# Patient Record
Sex: FEMALE | Race: WHITE | NOT HISPANIC OR LATINO | Employment: FULL TIME
[De-identification: names, ages, dates, MRNs, and addresses within clinical notes are randomized per-mention and may not be internally consistent; named-entity substitution may affect disease eponyms.]

---

## 2017-10-30 ENCOUNTER — RECORDS - HEALTHEAST (OUTPATIENT)
Dept: ADMINISTRATIVE | Facility: OTHER | Age: 22
End: 2017-10-30

## 2018-06-26 ENCOUNTER — COMMUNICATION - HEALTHEAST (OUTPATIENT)
Dept: SURGERY | Facility: CLINIC | Age: 23
End: 2018-06-26

## 2018-08-03 ENCOUNTER — RECORDS - HEALTHEAST (OUTPATIENT)
Dept: ADMINISTRATIVE | Facility: OTHER | Age: 23
End: 2018-08-03

## 2018-08-03 ENCOUNTER — RECORDS - HEALTHEAST (OUTPATIENT)
Dept: LAB | Facility: CLINIC | Age: 23
End: 2018-08-03

## 2018-08-05 LAB
BACTERIA SPEC CULT: ABNORMAL
BACTERIA SPEC CULT: ABNORMAL

## 2018-08-17 ENCOUNTER — OFFICE VISIT - HEALTHEAST (OUTPATIENT)
Dept: SURGERY | Facility: CLINIC | Age: 23
End: 2018-08-17

## 2018-08-17 DIAGNOSIS — E28.2 PCOS (POLYCYSTIC OVARIAN SYNDROME): ICD-10-CM

## 2018-08-17 DIAGNOSIS — E66.01 OBESITY, CLASS III, BMI 40-49.9 (MORBID OBESITY) (H): ICD-10-CM

## 2018-08-17 ASSESSMENT — MIFFLIN-ST. JEOR: SCORE: 2016.41

## 2018-09-06 ENCOUNTER — OFFICE VISIT - HEALTHEAST (OUTPATIENT)
Dept: SURGERY | Facility: CLINIC | Age: 23
End: 2018-09-06

## 2018-09-06 DIAGNOSIS — Z71.3 DIETARY COUNSELING: ICD-10-CM

## 2018-09-06 DIAGNOSIS — E66.01 OBESITY, MORBID, BMI 40.0-49.9 (H): ICD-10-CM

## 2018-09-06 ASSESSMENT — MIFFLIN-ST. JEOR: SCORE: 1991.46

## 2018-09-17 ENCOUNTER — OFFICE VISIT - HEALTHEAST (OUTPATIENT)
Dept: SURGERY | Facility: CLINIC | Age: 23
End: 2018-09-17

## 2018-09-17 DIAGNOSIS — E66.01 OBESITY, CLASS III, BMI 40-49.9 (MORBID OBESITY) (H): ICD-10-CM

## 2018-09-17 DIAGNOSIS — E28.2 PCOS (POLYCYSTIC OVARIAN SYNDROME): ICD-10-CM

## 2018-09-17 DIAGNOSIS — E03.9 HYPOTHYROIDISM: ICD-10-CM

## 2018-09-17 ASSESSMENT — MIFFLIN-ST. JEOR: SCORE: 2000.53

## 2018-09-18 ENCOUNTER — COMMUNICATION - HEALTHEAST (OUTPATIENT)
Dept: SURGERY | Facility: CLINIC | Age: 23
End: 2018-09-18

## 2018-11-16 ENCOUNTER — RECORDS - HEALTHEAST (OUTPATIENT)
Dept: LAB | Facility: CLINIC | Age: 23
End: 2018-11-16

## 2018-11-16 LAB
ALBUMIN SERPL-MCNC: 4.1 G/DL (ref 3.5–5)
ALP SERPL-CCNC: 72 U/L (ref 45–120)
ALT SERPL W P-5'-P-CCNC: 35 U/L (ref 0–45)
ANION GAP SERPL CALCULATED.3IONS-SCNC: 12 MMOL/L (ref 5–18)
AST SERPL W P-5'-P-CCNC: 30 U/L (ref 0–40)
BASOPHILS # BLD AUTO: 0.1 THOU/UL (ref 0–0.2)
BASOPHILS NFR BLD AUTO: 1 % (ref 0–2)
BILIRUB SERPL-MCNC: 0.2 MG/DL (ref 0–1)
BUN SERPL-MCNC: 12 MG/DL (ref 8–22)
CALCIUM SERPL-MCNC: 10.4 MG/DL (ref 8.5–10.5)
CHLORIDE BLD-SCNC: 103 MMOL/L (ref 98–107)
CO2 SERPL-SCNC: 23 MMOL/L (ref 22–31)
CREAT SERPL-MCNC: 0.79 MG/DL (ref 0.6–1.1)
EOSINOPHIL # BLD AUTO: 0.3 THOU/UL (ref 0–0.4)
EOSINOPHIL NFR BLD AUTO: 3 % (ref 0–6)
ERYTHROCYTE [DISTWIDTH] IN BLOOD BY AUTOMATED COUNT: 12.8 % (ref 11–14.5)
GFR SERPL CREATININE-BSD FRML MDRD: >60 ML/MIN/1.73M2
GLUCOSE BLD-MCNC: 107 MG/DL (ref 70–125)
HCT VFR BLD AUTO: 43.4 % (ref 35–47)
HGB BLD-MCNC: 14.3 G/DL (ref 12–16)
LYMPHOCYTES # BLD AUTO: 3.4 THOU/UL (ref 0.8–4.4)
LYMPHOCYTES NFR BLD AUTO: 30 % (ref 20–40)
MCH RBC QN AUTO: 29.2 PG (ref 27–34)
MCHC RBC AUTO-ENTMCNC: 32.9 G/DL (ref 32–36)
MCV RBC AUTO: 89 FL (ref 80–100)
MONOCYTES # BLD AUTO: 0.7 THOU/UL (ref 0–0.9)
MONOCYTES NFR BLD AUTO: 6 % (ref 2–10)
NEUTROPHILS # BLD AUTO: 6.8 THOU/UL (ref 2–7.7)
NEUTROPHILS NFR BLD AUTO: 60 % (ref 50–70)
PLATELET # BLD AUTO: 430 THOU/UL (ref 140–440)
PMV BLD AUTO: 9.4 FL (ref 8.5–12.5)
POTASSIUM BLD-SCNC: 4.5 MMOL/L (ref 3.5–5)
PROT SERPL-MCNC: 7.9 G/DL (ref 6–8)
RBC # BLD AUTO: 4.9 MILL/UL (ref 3.8–5.4)
SODIUM SERPL-SCNC: 138 MMOL/L (ref 136–145)
TSH SERPL DL<=0.005 MIU/L-ACNC: 2.76 UIU/ML (ref 0.3–5)
VIT B12 SERPL-MCNC: 695 PG/ML (ref 213–816)
WBC: 11.4 THOU/UL (ref 4–11)

## 2018-11-17 LAB — HBA1C MFR BLD: 5.4 % (ref 4.2–6.1)

## 2018-11-19 LAB — 25(OH)D3 SERPL-MCNC: 43.2 NG/ML (ref 30–80)

## 2018-12-07 ENCOUNTER — COMMUNICATION - HEALTHEAST (OUTPATIENT)
Dept: TELEHEALTH | Facility: CLINIC | Age: 23
End: 2018-12-07

## 2018-12-07 ENCOUNTER — OFFICE VISIT - HEALTHEAST (OUTPATIENT)
Dept: SURGERY | Facility: CLINIC | Age: 23
End: 2018-12-07

## 2018-12-07 DIAGNOSIS — E66.01 OBESITY, CLASS III, BMI 40-49.9 (MORBID OBESITY) (H): ICD-10-CM

## 2018-12-07 DIAGNOSIS — Z13.220 LIPID SCREENING: ICD-10-CM

## 2018-12-07 DIAGNOSIS — E28.2 PCOS (POLYCYSTIC OVARIAN SYNDROME): ICD-10-CM

## 2018-12-07 ASSESSMENT — MIFFLIN-ST. JEOR: SCORE: 1982.39

## 2018-12-10 ENCOUNTER — COMMUNICATION - HEALTHEAST (OUTPATIENT)
Dept: SURGERY | Facility: CLINIC | Age: 23
End: 2018-12-10

## 2018-12-19 ENCOUNTER — AMBULATORY - HEALTHEAST (OUTPATIENT)
Dept: SURGERY | Facility: CLINIC | Age: 23
End: 2018-12-19

## 2018-12-19 DIAGNOSIS — E66.01 OBESITY, CLASS III, BMI 40-49.9 (MORBID OBESITY) (H): ICD-10-CM

## 2018-12-19 DIAGNOSIS — E28.2 PCOS (POLYCYSTIC OVARIAN SYNDROME): ICD-10-CM

## 2018-12-28 ENCOUNTER — RECORDS - HEALTHEAST (OUTPATIENT)
Dept: LAB | Facility: CLINIC | Age: 23
End: 2018-12-28

## 2018-12-28 LAB
SHIGA TOXIN 1: NEGATIVE
SHIGA TOXIN 2: NEGATIVE

## 2018-12-30 LAB — BACTERIA SPEC CULT: NORMAL

## 2019-01-14 ENCOUNTER — OFFICE VISIT - HEALTHEAST (OUTPATIENT)
Dept: SURGERY | Facility: CLINIC | Age: 24
End: 2019-01-14

## 2019-01-14 DIAGNOSIS — Z71.3 NUTRITIONAL COUNSELING: ICD-10-CM

## 2019-01-14 DIAGNOSIS — E66.01 OBESITY, MORBID, BMI 40.0-49.9 (H): ICD-10-CM

## 2019-01-14 DIAGNOSIS — G45.9 TIA (TRANSIENT ISCHEMIC ATTACK): ICD-10-CM

## 2019-01-14 ASSESSMENT — MIFFLIN-ST. JEOR: SCORE: 1964.25

## 2019-02-08 ENCOUNTER — OFFICE VISIT - HEALTHEAST (OUTPATIENT)
Dept: SURGERY | Facility: CLINIC | Age: 24
End: 2019-02-08

## 2019-02-08 DIAGNOSIS — E66.01 OBESITY, CLASS III, BMI 40-49.9 (MORBID OBESITY) (H): ICD-10-CM

## 2019-02-08 ASSESSMENT — MIFFLIN-ST. JEOR: SCORE: 1937.94

## 2019-04-05 ENCOUNTER — OFFICE VISIT - HEALTHEAST (OUTPATIENT)
Dept: SURGERY | Facility: CLINIC | Age: 24
End: 2019-04-05

## 2019-04-05 DIAGNOSIS — Z71.3 NUTRITIONAL COUNSELING: ICD-10-CM

## 2019-04-05 DIAGNOSIS — G45.9 TIA (TRANSIENT ISCHEMIC ATTACK): ICD-10-CM

## 2019-04-05 DIAGNOSIS — E66.01 OBESITY, MORBID, BMI 40.0-49.9 (H): ICD-10-CM

## 2019-04-05 ASSESSMENT — MIFFLIN-ST. JEOR: SCORE: 1986.93

## 2019-06-17 ENCOUNTER — RECORDS - HEALTHEAST (OUTPATIENT)
Dept: LAB | Facility: CLINIC | Age: 24
End: 2019-06-17

## 2019-06-18 LAB
HIV 1+2 AB+HIV1 P24 AG SERPL QL IA: NEGATIVE
TSH SERPL DL<=0.005 MIU/L-ACNC: 1.05 UIU/ML (ref 0.3–5)

## 2019-06-19 LAB
HCV AB SERPL QL IA: NEGATIVE
HEPATITIS B SURFACE ANTIBODY LHE- HISTORICAL: NEGATIVE

## 2019-06-25 ENCOUNTER — AMBULATORY - HEALTHEAST (OUTPATIENT)
Dept: SURGERY | Facility: CLINIC | Age: 24
End: 2019-06-25

## 2019-06-25 DIAGNOSIS — E66.01 OBESITY, CLASS III, BMI 40-49.9 (MORBID OBESITY) (H): ICD-10-CM

## 2019-07-12 ENCOUNTER — OFFICE VISIT - HEALTHEAST (OUTPATIENT)
Dept: SURGERY | Facility: CLINIC | Age: 24
End: 2019-07-12

## 2019-07-12 DIAGNOSIS — E28.2 POLYCYSTIC OVARY SYNDROME: ICD-10-CM

## 2019-07-12 DIAGNOSIS — E03.9 HYPOTHYROIDISM, UNSPECIFIED TYPE: ICD-10-CM

## 2019-07-12 DIAGNOSIS — E28.2 PCOS (POLYCYSTIC OVARIAN SYNDROME): ICD-10-CM

## 2019-07-12 DIAGNOSIS — E66.01 OBESITY, CLASS III, BMI 40-49.9 (MORBID OBESITY) (H): ICD-10-CM

## 2019-07-12 DIAGNOSIS — E66.01 MORBID OBESITY WITH BMI OF 40.0-44.9, ADULT (H): ICD-10-CM

## 2019-07-12 DIAGNOSIS — G43.909 MIGRAINE WITHOUT STATUS MIGRAINOSUS, NOT INTRACTABLE, UNSPECIFIED MIGRAINE TYPE: ICD-10-CM

## 2019-07-12 ASSESSMENT — MIFFLIN-ST. JEOR: SCORE: 1986.93

## 2019-07-29 ENCOUNTER — RECORDS - HEALTHEAST (OUTPATIENT)
Dept: LAB | Facility: CLINIC | Age: 24
End: 2019-07-29

## 2019-07-29 LAB — HIV 1+2 AB+HIV1 P24 AG SERPL QL IA: NEGATIVE

## 2019-08-09 ENCOUNTER — RECORDS - HEALTHEAST (OUTPATIENT)
Dept: LAB | Facility: CLINIC | Age: 24
End: 2019-08-09

## 2019-08-12 LAB — BACTERIA SPEC CULT: NORMAL

## 2019-11-05 ENCOUNTER — COMMUNICATION - HEALTHEAST (OUTPATIENT)
Dept: SURGERY | Facility: CLINIC | Age: 24
End: 2019-11-05

## 2019-11-08 ENCOUNTER — COMMUNICATION - HEALTHEAST (OUTPATIENT)
Dept: SURGERY | Facility: CLINIC | Age: 24
End: 2019-11-08

## 2019-11-13 ENCOUNTER — AMBULATORY - HEALTHEAST (OUTPATIENT)
Dept: SURGERY | Facility: CLINIC | Age: 24
End: 2019-11-13

## 2019-11-13 DIAGNOSIS — E66.01 OBESITY, CLASS III, BMI 40-49.9 (MORBID OBESITY) (H): ICD-10-CM

## 2019-11-15 ENCOUNTER — RECORDS - HEALTHEAST (OUTPATIENT)
Dept: LAB | Facility: CLINIC | Age: 24
End: 2019-11-15

## 2019-11-16 ENCOUNTER — RECORDS - HEALTHEAST (OUTPATIENT)
Dept: LAB | Facility: CLINIC | Age: 24
End: 2019-11-16

## 2019-11-17 LAB — BACTERIA SPEC CULT: NO GROWTH

## 2019-11-18 ENCOUNTER — RECORDS - HEALTHEAST (OUTPATIENT)
Dept: ADMINISTRATIVE | Facility: OTHER | Age: 24
End: 2019-11-18

## 2019-11-18 LAB
BKR LAB AP ABNORMAL BLEEDING: NO
BKR LAB AP BIRTH CONTROL/HORMONES: NORMAL
BKR LAB AP CERVICAL APPEARANCE: NORMAL
BKR LAB AP GYN ADEQUACY: NORMAL
BKR LAB AP GYN INTERPRETATION: NORMAL
BKR LAB AP GYN OTHER FINDINGS: NORMAL
BKR LAB AP HPV REFLEX: NORMAL
BKR LAB AP LMP: NORMAL
BKR LAB AP PATIENT STATUS: NORMAL
BKR LAB AP PREVIOUS ABNORMAL: NORMAL
BKR LAB AP PREVIOUS NORMAL: NORMAL
C TRACH DNA SPEC QL PROBE+SIG AMP: NEGATIVE
HIGH RISK?: NO
N GONORRHOEA DNA SPEC QL NAA+PROBE: NEGATIVE
PATH REPORT.COMMENTS IMP SPEC: NORMAL
RESULT FLAG (HE HISTORICAL CONVERSION): NORMAL

## 2019-11-22 ENCOUNTER — RECORDS - HEALTHEAST (OUTPATIENT)
Dept: LAB | Facility: CLINIC | Age: 24
End: 2019-11-22

## 2019-11-22 LAB
CHOLEST SERPL-MCNC: 130 MG/DL
FASTING STATUS PATIENT QL REPORTED: NORMAL
HDLC SERPL-MCNC: 51 MG/DL
LDLC SERPL CALC-MCNC: 60 MG/DL
TRIGL SERPL-MCNC: 93 MG/DL
TSH SERPL DL<=0.005 MIU/L-ACNC: 3.17 UIU/ML (ref 0.3–5)

## 2020-05-27 ENCOUNTER — COMMUNICATION - HEALTHEAST (OUTPATIENT)
Dept: SURGERY | Facility: CLINIC | Age: 25
End: 2020-05-27

## 2020-05-27 DIAGNOSIS — E66.01 OBESITY, CLASS III, BMI 40-49.9 (MORBID OBESITY) (H): ICD-10-CM

## 2020-05-27 DIAGNOSIS — E28.2 PCOS (POLYCYSTIC OVARIAN SYNDROME): ICD-10-CM

## 2020-08-09 ENCOUNTER — HOSPITAL ENCOUNTER (OUTPATIENT)
Dept: MEDSURG UNIT | Facility: CLINIC | Age: 25
Discharge: HOME | End: 2020-08-09
Attending: OBSTETRICS & GYNECOLOGY | Admitting: OBSTETRICS & GYNECOLOGY

## 2020-08-12 ENCOUNTER — ANESTHESIA - HEALTHEAST (OUTPATIENT)
Dept: OBGYN | Facility: CLINIC | Age: 25
End: 2020-08-12

## 2020-08-12 ENCOUNTER — COMMUNICATION - HEALTHEAST (OUTPATIENT)
Dept: SCHEDULING | Facility: CLINIC | Age: 25
End: 2020-08-12

## 2020-08-13 ENCOUNTER — HOME CARE/HOSPICE - HEALTHEAST (OUTPATIENT)
Dept: HOME HEALTH SERVICES | Facility: HOME HEALTH | Age: 25
End: 2020-08-13

## 2020-08-17 ENCOUNTER — COMMUNICATION - HEALTHEAST (OUTPATIENT)
Dept: OBGYN | Facility: CLINIC | Age: 25
End: 2020-08-17

## 2020-08-21 ENCOUNTER — RECORDS - HEALTHEAST (OUTPATIENT)
Dept: LAB | Facility: CLINIC | Age: 25
End: 2020-08-21

## 2020-08-23 LAB
BACTERIA SPEC CULT: ABNORMAL
BACTERIA SPEC CULT: ABNORMAL

## 2021-05-07 LAB
ABO (EXTERNAL): NORMAL
GROUP B STREPTOCOCCUS (EXTERNAL): POSITIVE
HEMOGLOBIN (EXTERNAL): 13.4 G/DL (ref 12–16)
HEPATITIS B SURFACE ANTIGEN (EXTERNAL): NONREACTIVE
HEPATITIS C ANTIBODY (EXTERNAL): NONREACTIVE
HIV1+2 AB SERPL QL IA: NONREACTIVE
PLATELET COUNT (EXTERNAL): 419 10E3/UL (ref 150–400)
RH (EXTERNAL): NEGATIVE
RUBELLA ANTIBODY IGG (EXTERNAL): NORMAL
TREPONEMA PALLIDUM ANTIBODY (EXTERNAL): NONREACTIVE

## 2021-05-27 NOTE — PROGRESS NOTES
Ongoing SW/CM Assessment/Plan of Care Note     See SW/CM flowsheets for goals and other objective data.      Disposition: home with resumption of Home Health RN/ PT and added OT.  Increase in  services.       Progress note: CM RN spoke to patients active  agency [facility] to resume RN/PT and add OT.  agency made aware patient will be having surgery on Friday and will need RN follow up for wound care.  [facility] agreeable.  Updated clincals and HH order sent via [facility].     MD, RN and patient made aware.  Weekend CM to follow up with  agency at time of discharge.            "Non-surgical Weight Loss Follow Up Diet Evaluation    Assessment:  This patient is a 23 y.o. female is being seen today for follow-up non-surgical nutritional evaluation. Today we reviewed the patients current eating habits and level of physical activity, and instructed on the changes that are required for successful weight loss outcomes.    Pt's mother present     Phentermine: not discussed at this visit     Pt's Initial Weight: 275 lbs  Weight: (!) 269 lb (122 kg)  Weight loss from initial: 6  % Weight loss: 2.18 %    BMI: Body mass index is 43.42 kg/m .  IBW: 130 lbs    Personal goal weight: \"want to look better\"     Pt Active Problem List Diagnosis:     Patient Active Problem List   Diagnosis     Abdominal pain     Acute gastroenteritis     TIA (transient ischemic attack)     Estimated RMR ([place]-St Jeor equation): 2033 calories  Protein requirements (.5grams to .9grams per pound IBW, 20-30% of calories, minimum of 60-80gm per day):  50-90 grams    Progress made since last visit: Pt disappointed in 11 lb weight gain since last clinic visit. She correlates weight gain with taking birth control and having PCOS. 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     Concerns: High milk consumption, frequent eating out, large portion sizes, low protein at lunch, no exercise routine established, poor water intake.     Diet Recall/Time:   Breakfast: egg, yogurt or fair life milk (20g)   Am Snack: none   Lunch: 1-2 apples and peanut butter, carrots and celery (7g)   Pm snack: none   Dinner: chicken, potatoes, salad (20g)   HS Snack: popcorn     Protein: 50-60 grams    Meals per week away from home: 1x/week      Recommended limiting eating out to no more than 2x/week.  Patient and I reviewed the importance of eating three consistent meals per day; as well as meal timing to be spaced 4-5 hours apart.  Snack choices: 100-150 calories (1-2x/day if physically hungry), incorporating a " fruit/vegetable w/ protein source.      Portion Sizes problematic? NO per patient/diet recall  Encouraged slowing meal times down, 20-30 minutes, chewing to applesauce consistency.   To aid in proper portion control and slow meal time down discussed consuming meals off smaller plates, use toddler/children utensils and set utensils down after each bite.    Protein, vegetables/fruits, carbohydrates:   The patient and I discussed the importance of including lean/low fat protein at each meal and limiting carbohydrate intake to less than 25% of plate volume.     Beverages (Type/Oz. per day)  Water: 32 oz   Coffee: none   Tea: none   Milk: 3 glasses milk     Discussed the importance of adequate hydration and the goal of 64+ oz of fluid daily.   The patient understands the importance of  avoiding all sweetened and alcoholic drinks, and instead choosing 64 oz plain water.    Exercise  Nothing routine established.     Pt's understands that 45-60 minutes of daily activity is an important part of weight loss success.   Encouraged pt to incorporate  strength training exercise in addition to cardiovascular exercise most days of the week.    PES statement:     1.  (NI-1.3)Excessive energy intake related to Food and nutrition related knowledge deficit concerning excessive energy/oral intake as evidenced by Intake of high caloric density foods/beverages (milk) at meals and/or snacks; large portions; frequent grazing; Estimated intake that exceeds estimated daily energy intake; Frequent fast food or restaurant intake; and BMI 43.     Intervention:  Discussion:  1. Encouraged pt through motivational interviewing techniques.  2. Discussed using a protein shake as a lunch meal, provided smoothie recipes using protein powder.   3. Reviewed lean protein sources.  20-30 gm protein at 3 meals daily. 60-80 grams daily total.  4. Educated pt on importance of exercise for weight loss.   5. Discussed importance of goal setting for reaching  "desired weight loss.     Instructions/Goals:   1. Include 20-30 gm protein at each meal.  2. Increase vegetable/fruit intake, by having a vegetable or fruit with each meal daily. Recommended pt to increase vegetable/fruit intake to 4-5 servings daily.  3. Increase fluid intake to 64oz daily: choose plain or calorie/alcohol-free beverages.  4. Incorporate daily structured activity, 45-60 minutes most days of the week  5. Read food labels more consistently: keeping total fat grams <10, total sugar grams <10, fiber >3gm per serving.   6. Fill out food journal and bring to next month's visit.  7. Practice plate method: 1/2 plate lean/low fat protein source, vegetable/fruit, <25% of plate complex carbohydrates.  8. Carbohydrates from grain sources at meal times to be no more than 1 Carb Choice, ie: 15-20 gm total carbohydrate per serving  9. Practice eating off of smaller plates/bowls, chewing to applesauce consistency, taking 20-30 minutes to eat in a calm/relaxed environment without distractions of tv/email/cell phone.    Goals set by patient:  1. Using protein smoothie for lunch recipe  2. Come up with \"game plan\" for exercise once done with school.     Handouts Provided:  List of protein sources  Plate method  Protein shake recipes    Monitor/Evaluation:    Pt will f/u in one month with bariatrician, and f/u in two months with RD.    Plan for next visit with RD:  Review goals  Educate on fiber   Exercise      Time In: 1:30pm  Time Out: 2:00pm      ABN signed: Yes      "

## 2021-05-30 NOTE — PROGRESS NOTES
"Bariatric Follow-up    23 y.o.  female BMI:Body mass index is 43.42 kg/m .    Weight:   Wt Readings from Last 1 Encounters:   07/12/19 (!) 269 lb (122 kg)    pounds  Height: 5' 6\" (1.676 m) (7/12/2019 11:38 AM)  Initial Weight: 275 lbs (7/12/2019 11:38 AM)  Weight: (!) 269 lb (122 kg) (266 AT HOME) (7/12/2019 11:38 AM)  Weight loss from initial: 6 (7/12/2019 11:38 AM)  % Weight loss: 2.18 % (7/12/2019 11:38 AM)  BMI (Calculated): 43.4 (7/12/2019 11:38 AM)  SpO2: 96 % (7/12/2019 11:38 AM)  Waist Circumference (In): 53 Inches (8/17/2018  1:44 PM)  Hip Circumference (In): 53.5 Inches (8/17/2018  1:44 PM)  Neck Circumference (In): 16 Inches (8/17/2018  1:44 PM)  NSAIDS: Yes (8/17/2018  1:44 PM)      Comorbidities:  Patient Active Problem List   Diagnosis     Abdominal pain     Acute gastroenteritis     TIA (transient ischemic attack)     Hypothyroidism     Polycystic ovary syndrome     Morbid obesity with BMI of 40.0-44.9, adult (H)     Migraine       Interim: Maintaining a 6# weight loss. Got a fitbit which shows she is getting 6 hours of sleep. Wedding is in October. 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   Roxie is not a healthy eater.    Plan: Reviewed PCOS, insulin resistance and stressed the importance of restorative sleep (turn off phone/TV), stress management (she tackles problems when stressed), exercise (is getting 10K steps), and muscle maintenance.  Reinforced the \"protein first\" approach and realistic goals (maintaining weight may be OK during extremely stressful time).       We discussed [facility] Basics including:  -eating 3 meals daily  -eating protein first  -eating slowly, chewing food well  -avoiding/limiting calorie containing beverages  -choosing wheat, not white with breads, crackers, pastas, meka, bagels, tortillas, rice  -limiting restaurant or cafeteria eating to twice a week or less  -We discussed the importance of restorative sleep and stress management in " maintaining a healthy weight.  -We discussed insulin resistance and glycemic index as it relates to appetite and weight control  -We discussed the National Weight Control Registry healthy weight maintenance strategies and ways to optimize metabolism.  -We discussed the importance of physical activity including cardiovascular and strength training in maintaining a healthier weight and explored viable options.    Most recent labs:  Lab Results   Component Value Date    WBC 11.4 (H) 11/16/2018    HGB 14.3 11/16/2018    HCT 43.4 11/16/2018    MCV 89 11/16/2018     11/16/2018     No results found for: CHOL  No results found for: HDL  No results found for: LDLCALC  No results found for: TRIG  No components found for: CHOLHDL  Lab Results   Component Value Date    ALT 35 11/16/2018    AST 30 11/16/2018    ALKPHOS 72 11/16/2018    BILITOT 0.2 11/16/2018     Lab Results   Component Value Date    HGBA1C 5.4 11/16/2018     Lab Results   Component Value Date    HGATSTEC39 695 11/16/2018     Lab Results   Component Value Date    BBRIQPNH91RA 43.2 11/16/2018     No results found for: FERRITIN  No results found for: PTH  No results found for: 79331  No results found for: 7597  Lab Results   Component Value Date    TSH 1.05 06/17/2019       DIETARY HISTORY  Meals Per Day: yes  Eating Protein First?: yes  Food Diary: B:eggs and juan L:shake and fruit D:pork and rice  Snacks Per Day: no  Typical Snack: fruit or protein bar  Fluid Intake: intentional  Portion Control: improved  Calorie Containing Beverages: no  Alcohol per week: rare  Typical Protein Food Choices: variety  Choosing Whole Grains: yes  Grocery Shopping is done by: herself  Food Preparation is done by: herself  Meals at Restaurant/Cafeteria/Take Out Per Week: recent intervention  Eating at the Table: yes  TV is Off During Meals: yes    Positive Changes Since Last Visit: maintaing her weight loss despite stress  Struggling With: 10K steps daily between work and  "walking otherwise    Knowledgeable in Reading Food Labels: yes  Getting Adequate Protein: yes  Sleeping 7-8 hours/day no  Stress management takes care of it right away    PHYSICAL ACTIVITY PATTERNS:  Cardiovascular: 10K steps  Strength Training: not yet but playing softball    REVIEW OF SYSTEMS  GENERAL/CONSTITUTIONAL:  Fatigue: yes  CARDIOVASCULAR:  Chest Pain with Exertion: no  PULMONARY:  Dyspnea on exertion: yes  CPAP Use: no  Tobacco Use: no  Asthma Controlled: NA  GASTROINTESTINAL:  GERD/Heartburn: no  Gallbladder:   UROLOGIC:  Urinary Symptoms: no  NEUROLOGIC:  Headaches: migraine  Paresthesias: no  PSYCHIATRIC:  Moods: OK  MUSCULOSKELETAL/RHEUMATOLOGIC  Arthralgias: OK  Myalgias: no  ENDOCRINE:  Monitoring Blood Sugars: no  Sugars Well Controlled: yes  DERMATOLOGIC:  Rashes: no    PHYSICAL EXAM:  Vitals: /68   Pulse (!) 127   Resp 18   Ht 5' 6\" (1.676 m)   Wt (!) 269 lb (122 kg) Comment: 266 AT HOME  SpO2 96%   Breastfeeding? No   BMI 43.42 kg/m    Height: 5' 6\" (1.676 m) (7/12/2019 11:38 AM)  Initial Weight: 275 lbs (7/12/2019 11:38 AM)  Weight: (!) 269 lb (122 kg) (266 AT HOME) (7/12/2019 11:38 AM)  Weight loss from initial: 6 (7/12/2019 11:38 AM)  % Weight loss: 2.18 % (7/12/2019 11:38 AM)  BMI (Calculated): 43.4 (7/12/2019 11:38 AM)  SpO2: 96 % (7/12/2019 11:38 AM)  Waist Circumference (In): 53 Inches (8/17/2018  1:44 PM)  Hip Circumference (In): 53.5 Inches (8/17/2018  1:44 PM)  Neck Circumference (In): 16 Inches (8/17/2018  1:44 PM)  NSAIDS: Yes (8/17/2018  1:44 PM)      GEN: Pleasant, well groomed, in no acute distress  EYES: EOMI,  ENT: airway patent  NECK: no carotid bruits, no anterior/supraclavicular lymphadenopathy, thyroid normal   HEART: Rhythm regular, rate regular, no murmur   LUNGS: Clear  ABDOMEN: soft, non-tender, obese, no rashes   VASCULAR: bilateral new  lower extremity edema  MUSCULOSKELETAL:  muscle mass OK for age  SKIN:  no color changes of venous stasis, no " ulcerations    Time spent with patients 30 minutes, >50% in counseling and coordination of care.

## 2021-06-01 VITALS — BODY MASS INDEX: 43.39 KG/M2 | HEIGHT: 66 IN | WEIGHT: 270 LBS

## 2021-06-01 VITALS — WEIGHT: 275.5 LBS | BODY MASS INDEX: 44.27 KG/M2 | HEIGHT: 66 IN

## 2021-06-02 VITALS — BODY MASS INDEX: 43.23 KG/M2 | HEIGHT: 66 IN | WEIGHT: 269 LBS

## 2021-06-02 VITALS — WEIGHT: 272 LBS | BODY MASS INDEX: 43.71 KG/M2 | HEIGHT: 66 IN

## 2021-06-02 VITALS — BODY MASS INDEX: 42.43 KG/M2 | WEIGHT: 264 LBS | HEIGHT: 66 IN

## 2021-06-02 VITALS — HEIGHT: 66 IN | BODY MASS INDEX: 41.5 KG/M2 | WEIGHT: 258.2 LBS

## 2021-06-02 VITALS — WEIGHT: 268 LBS | HEIGHT: 66 IN | BODY MASS INDEX: 43.07 KG/M2

## 2021-06-03 VITALS — BODY MASS INDEX: 43.23 KG/M2 | WEIGHT: 269 LBS | HEIGHT: 66 IN

## 2021-06-03 NOTE — TELEPHONE ENCOUNTER
Patient calling back about getting  A refill on her phentermine. Patient would like a call back and ok to leave a detailed message.

## 2021-06-03 NOTE — TELEPHONE ENCOUNTER
Left a message for the patient letting her know that there is still another refill on her phentermine at the [facility] pharmacy for her to get.  If she wants it sent to a different pharmacy we will need to wait until Dr. Akhtar's return to the office next Tuesday.  Yvette Kearns RN

## 2021-06-04 VITALS — BODY MASS INDEX: 44.76 KG/M2 | HEIGHT: 65 IN

## 2021-06-08 NOTE — TELEPHONE ENCOUNTER
Pt called in requesting a refill for Metformin and asking about why the refill was denied yesterday. I let her know that I got a refill request yesterday with a different last name and the chart in Epic did not show that she was a patient of ours. The refill also came for  and pt sees . Pt is now  and the script came with her new  name. I changed her name in Epic and will send the request to . She says that the reason she hasn't been in for awhile is that she is pregnant and can't take any of the weight loss meds besides Metformin. I let her know that I will send the request to  and see what she says. Pt verbalized understanding.      Karyn Urrutia RN, CBN  [facility]  P [phone]  F [phone]

## 2021-06-10 NOTE — ANESTHESIA POSTPROCEDURE EVALUATION
Patient: Viktoria Siu  * No procedures listed *  Anesthesia type: epidural    Patient location: PACU  Last vitals: No vitals data found for the desired time range.    Post vital signs: stable  Level of consciousness: awake and responds to simple questions  Post-anesthesia pain: pain controlled  Post-anesthesia nausea and vomiting: no  Pulmonary: unassisted, return to baseline  Cardiovascular: stable and blood pressure at baseline  Hydration: adequate  Anesthetic events: no    QCDR Measures:  ASA# 11 - Renee-op Cardiac Arrest: ASA11B - Patient did NOT experience unanticipated cardiac arrest  ASA# 12 - Renee-op Mortality Rate: ASA12B - Patient did NOT die  ASA# 13 - PACU Re-Intubation Rate: ASA13B - Patient did NOT require a new airway mgmt  ASA# 10 - Composite Anes Safety: ASA10A - No serious adverse event    Additional Notes:

## 2021-06-10 NOTE — PROGRESS NOTES
Pt presents with c/o decreased fetal movement, a headache since 1700 and swelling in her feet today. EDC was 7/30/20. Plans to see primary OB on 8/11 to plan for IOL. No contractions or cramping, vaginal bleeding or leaking of fluid.  on arrival.  Madai Black RN

## 2021-06-10 NOTE — TELEPHONE ENCOUNTER
:   N/A    Language:   English    Discharge Follow-Up:  Follow-Up call by Outreach nurse: Call placed    Type of Delivery:      Feeding Method:  Breastfeeding    Feedings in 24Hrs:  >8x/Day    Breast engorgement:   No    Nipple tenderness:   No    Non-nursing engorgement discussed:   N/A    Amount of Feedin-1 oz    Number of Infant Stools in 24 hours:   >3    Stool:  Transition    Number of Infant voids in 24 hours:  >3    Urine:  Clear    Infant Jaundice:   Facial    Discussed increasing s/s of Jaundice:   Yes    Circumcision:   N/A    Maternal s/s of infection:   None    Maternal s/s of PIH:   WDL    Postpartum cramping:   Mild    Comfort:  Adequate with routine pain meds    Vaginal Bleeding:  WDL    S/S of Maternal Thrombosis:  None    Maternal BM Since Delivery:  No    Referrals:   None    Resources Used During Phone Call:  HEPS    Comments:   Spoke with pt/mom and she states things are going well overall. NB was at clinic today and will go back in on Fri for a wt check. Mom states there were no concerns today at the clinic except he had lost a little more wt. States feedings are going well. Mom has not had a bm. Discussed options including miralax. Has not been taking a stool softener. Denies any other questions or concerns at this time. Edu discussed. Questions answered.    Completed by:   Patricia Garza RN

## 2021-06-10 NOTE — PROGRESS NOTES
Pt status discussed with Dr. Newman. FHR category I with no contractions on toco. Pt has not tried tylenol for headache. Reports a hx of migraines. Dr. Newman states pt is OK to d/c to home with instructions to follow up in clinic as scheduled or return for labor or recurrence of decreased fetal movement. Tylenol as needed for headache.  Madai Black, RN

## 2021-06-10 NOTE — ANESTHESIA PROCEDURE NOTES
Epidural Block    Patient location during procedure: OB  Time Called: 8/12/2020 10:50 AM  Reason for Block:labor epidural  Staffing:  Performing  Anesthesiologist: Bimal Seaman MD  Preanesthetic Checklist  Completed: patient identified, risks, benefits, and alternatives discussed, timeout performed, consent obtained, at patient's request, airway assessed, oxygen available, suction available, emergency drugs available and hand hygiene performed  Procedure  Patient position: sitting  Prep: ChloraPrep  Patient monitoring: continuous pulse oximetry, heart rate and blood pressure  Approach: midline  Location: L3-L4  Injection technique: PRISCA saline  Number of Attempts:1  Needle  Needle type: [employer]   Needle gauge: 18 G     Catheter in Space: 4  Assessment  Sensory level:  No complications      Additional Notes:  After negative aspirate for heme/CSF, a test dose was given using three ml 1.5% xylocaine with epinephrine 1:200K.  Test dose was negative.  Catheter was taped securely.  Epidural catheter was then dosed with 8ml 0.25% Marcaine.  Epidural infusion initiated by OB RN as per orders.  Procedure was well tolerated.

## 2021-06-10 NOTE — ANESTHESIA PREPROCEDURE EVALUATION
Anesthesia Evaluation      Patient summary reviewed   No history of anesthetic complications     Airway   Mallampati: II  Neck ROM: full   Pulmonary - negative ROS and normal exam                          Cardiovascular - negative ROS and normal exam   Neuro/Psych - negative ROS     Endo/Other - negative ROS   (+) diabetes mellitus well controlled, hypothyroidism, obesity (BMI 49), pregnant     GI/Hepatic/Renal - negative ROS           Dental - normal exam                        Anesthesia Plan  Planned anesthetic: epidural    ASA 3     Anesthetic plan and risks discussed with: patient    Post-op plan: routine recovery

## 2021-06-16 PROBLEM — G45.9 TIA (TRANSIENT ISCHEMIC ATTACK): Status: ACTIVE | Noted: 2017-08-17

## 2021-06-16 PROBLEM — Z34.90 PREGNANT: Status: ACTIVE | Noted: 2020-08-12

## 2021-06-17 NOTE — PATIENT INSTRUCTIONS - HE
Patient Instructions by Belinda Chan MD at 7/12/2019 11:30 AM     Author: Belinda Chan MD Service: -- Author Type: Physician    Filed: 7/12/2019 12:01 PM Encounter Date: 7/12/2019 Status: Signed    : Belinda Chan MD (Physician)

## 2021-06-17 NOTE — PATIENT INSTRUCTIONS - HE
Patient Instructions by Belinda Chan MD at 2/8/2019  1:00 PM     Author: Belinda Chan MD Service: -- Author Type: Physician    Filed: 2/8/2019  1:16 PM Encounter Date: 2/8/2019 Status: Addendum    : Belinda Chan MD (Physician)    Related Notes: Original Note by Belinda Chan MD (Physician) filed at 2/8/2019  1:13 PM       FOODUCATE basil        [facility] Basics    Remember to:    -Eat 3 meals a day (not 2, not 5) Chew your food well/SLOW down  -Eat your protein first  -Be a water drinker/Minize liquid calories (no regular pop, no juice) skim or 1% milk OK  -Sleep 7-8 hours each night. Address sleep if problematic  -Stress management is important. Address if problematic  -Move-8000 steps daily Muscle: maintain your muscle mass (strength training 2X/wk)  -Wheat, not white (bread, pasta, crackers, meka, bagels, tortillas, rice)  -Limit restaurant, cafeteria, take out, drive through to 2 times per week or less  -Minimize caffeine, alcohol, and night-time snacking  -Consider keeping a food diary (i.e. My Fitness Pal, Lose It, or other food tracker)  -Follow up with the dietitian      **Some lean proteins: chicken, turkey, tuna, salmon, crab, fish, shrimp, scallops, lobster, lean cuts of beef and pork, luncheon meats, veggie burgers, beans (black, lima, garbanzo, brown, kidney, refried), chile, cottage cheese, string cheese, other cheese, eggs, tofu, peanut butter, nuts, vegan crumbles, greek yogurt

## 2021-06-19 NOTE — PROGRESS NOTES
[facility]:  Non-Surgical Weight Loss Intake Appointment   Date of visit: 8/17/2018  Physician: Leo Matta MD  Primary Care is Andi Lamb MD.  Viktoria Paulino   22 y.o.  female  Viktoria is a 22 y.o. year old female who is here today for consultation regarding non-surgical weight loss.   she was referred to the [facility] by her PCP, Dr. Lamb.       Weight History:   Wt Readings from Last 3 Encounters:   08/17/18 (!) 275 lb 8 oz (125 kg)   04/12/18 (!) 260 lb (117.9 kg)   08/17/17 (!) 227 lb (103 kg)     Body mass index is 44.47 kg/(m^2).       Assessment and Plan   Assessment: Viktoria Paulino is a 22 y.o.,female presenting for assistance with medical weight loss.  Identified issues contributing to her excess weight include:     She's trying to improve her fertility in anticipation of her wedding next year, October of 2019. She has a polycystic ovarian clinical picture with anovulation requiring OCPs to regulate with morbid obesity.  Unfortunately, after starting OCPs she noted a 50 lb weight gain.  Previously, under the direction of her PCP, she had some nice weight loss of 60 lbs with phentermine but failed to be durable and she never focused on an eating plan to stabilize her weight.     Examination of her dietary history reveals a heavy carb laden diet with tendencies towards sweets which further exacerbate her PCOS morphology and weight gain.     She's currently without any structured exercise although has many sporadic hobbies that involve activity (skiing, tubing).  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     She understands that all weight loss meds can be teratogenic/harmful to fetal development and will be mindful and remain on birth control.  Given her weight gain on OCPs she could follow up with her PCP for consideration of an IUD device/nuva ring or alternative lower dose  OCPs.         Plan:  1.  Behavior Goals: 3 daily meals plan, ideally with a large breakfast, medium lunch        and smaller dinner. Avoidance of sugared-sweetened beverages and processed        carbohydrate snacks.  Work towards pre-planning meals to avoid falling back into old             habits/obesogenic habits.  Daily Food Tracking and morning weight recommended.  Weekly       check-in through [facility] to increase compliance.    2.  Diet Goals: Recommend a protein focused, lower carbohydrate diet with an initial goal of 80g/day of protein and overall, Calorie daily restriction.  Increased protein intake to insure at        least 20-30 grams of protein per meal.      3.  Exercise/Activity Goals: start couch to 5k walking program with 2 days or more a weight of resistanct/weight training.  Long term goal of increasing endurance to allow for at least            200 minutes weekly of moderate exercise to maintain weight loss.      4.  Recommendation regarding weight loss medication:  Trial of phentermine, 18.75mg to start along with metformin.    5.  Referral to Dietician for further education and customization of diet plan.    6.  Stress Reduction: see above. 4,7, 8 breathing techniques demonstrated. Exercise should be helpful as well    7.  Intake Labs:  Reviewed her April studies, TSH ok at that point in time will recheck labs/vitamin levels.    8.   PCOS: weight loss and metformin support should help. Eventual goal to conceive in the next 18-24 months potentially but no sooner than 13 months from now.    9. Sinus infection: currently on Zpack by her PCP. Advised holding weight loss meds until recovered.      1. PCOS (polycystic ovarian syndrome)  Glycosylated Hemoglobin A1c    metFORMIN (GLUCOPHAGE) 500 MG tablet   2. Obesity, Class III, BMI 40-49.9 (morbid obesity) (H)  phentermine (ADIPEX-P) 37.5 mg tablet    Amb Referral to Bariatric Dietician    Comprehensive Metabolic Panel    HM2(CBC w/o Differential)     "Vitamin D, Total (25-Hydroxy)    Thyroid Stimulating Hormone (TSH)    Vitamin B12    Glycosylated Hemoglobin A1c    metFORMIN (GLUCOPHAGE) 500 MG tablet          Return in about 4 weeks (around 9/14/2018) for Recheck.     Weight and Lifestyle History    Sleep history: goes to sleep around 11pm. Asleep easily. Sometimes wakes 1-2 times. Wakes up around 7-7:30am, to her Mom (\"can't hear alarms\"). Once awake, gets ready. Breakfast is soon after waking, PB and J toast (once slice), pancakes. May have 8-12 oz of 2% milk. Eggs/juan sometimes as well. 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 Will drink a 32 oz bottle of water at work. Will work through until lunch, may have hot chocolates. Lunch is 12:15 (brings PB and Jelly, smuckers; watermelon and cantelope/fruit and chips/chocolate). May have afternoon snack, may have some chocolate from gift shop if no sweets in her lunch. Ends work at 5pm, heads home and changes and eats dinner (protein/starch w/ milk). After supper, will (will watch brother play baseball (17yo), likes to golf/volley ball.  May go tubing, likes downhill skiing. Walking. Regular bikes but not a regular rider. Winds down w/ TV or uses phone.  Hours per night: see above  Snoring/apnea/insomnia? rarely  Restful/refreshed? sometimes  Shift work? no  CPAP/BiPAP? no  Sleep study previously? no  TV in bedroom? no  Sleep aids/pills? no      STOP-BANG score for sleep apnea : 2  For general population   CHARLIE - Low Risk : Yes to 0 - 2 questions  CHARLIE - Intermediate Risk : Yes to 3 - 4 questions  CHARLIE - High Risk : Yes to 5 - 8 questions  or Yes to 2 or more of 4 STOP questions + male gender  or Yes to 2 or more of 4 STOP questions + BMI > 35kg/m2  or Yes to 2 or more of 4 STOP questions + neck circumference 17 inches / 43cm in male or 16 inches / 41cm in female    Weight History:  In what way is your excess weight affecting your wellness/health? Some ovulation/fertility concerns. " Feels more tired, lack of confidence.  Heaviest weight: 275 lbs.  Any Previous weight loss, what was the most lost and method: phentermine in 2015, was around 250 lbs and got down to 190 lbs, felt great.  Birth weIight, High School graduation weight: after HS, around 200 lbs.  Lowest adult weight: 190s  Maternal health/smoking/weight: non smoker.   When did you start gaining weight and what were the circumstances? Noticed about 50lb weight gain since starting birth control  Is anyone else in your immediate family overweight? no  Finally,  Is there a weight you desire to be, what is your goal weight that would define successful weight loss for you? Under 200 lbs   I would like to lose weight so I can  :  Look good for my wedding.   .     Barriers to weight loss:  Is anyone else at home/work/family a barrier to your weight loss? Yes, fiance:    Work schedule/location? See above  Current stressors include: money. 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  Mobility problems: no    Counseled on health benefits of weight loss, goals for metabolic/diabetic risk reduction, HTN reduction, improved sleep and mobility, cancer reduction, longevity.    Fitness History:  Were you ever an athlete?yes     Which sports? See above  When was the last time you walked/hiked a mile? This Summer.  Do you have any limitations for activity?no  Do you have access to a gym, pool, club, fitness center, or ? Weight room at school but not interested.                 Do you have any fitness goals/dreams that could motivate your weight loss?no    On a scale from 0-10,  how willing are you to start some sort of movement/exercise regimen? Ready.    Counseled on physiologic benefits of exercise and for long term weight maintenance, goal working towards 200-300 minutes weekly.     Food History:  Who buys groceries?  Mother (live with her)  Do you eat at dinner table, is the TV on or off, family present? family  Any  "soda, how much? rare  Meals per day? 3  Snacks per day? 1-2  Breakfast typically is: see above  Lunch typically is: see above  Dinner  is: see above  Weekly Fast Food/dining out: occasional, fiance may influence her more towards junk foods and foresees the upcoming [place] as a test given her fiance likes, \"to go 5 times a year\".   Snacks consist of: see above, mostly carbs/sweets.    Food sensitivities/allergies/intolerances/avoidances: none.  Water per day? At least 32 oz of water and about 20-24oz of milk daily.    Any binging behavior?no  Any induced vomiting/purging? no  Any history of anorexia/bulimia? no  Any night eating issues? Formerly: no longer.   Stress induced eating? yes    Goal Setting:  Short Term Goals: under 250 lbs is about her 10% goal.  Long Term Goals 200 lbs or under.         Patient Profile   Social History     Social History Narrative     Family History   Problem Relation Age of Onset     Stroke Mother           Past Medical History   Patient Active Problem List   Diagnosis     Abdominal pain     Acute gastroenteritis     TIA (transient ischemic attack)     Cephalexin  Current Outpatient Prescriptions   Medication Sig Note     ibuprofen (ADVIL,MOTRIN) 400 MG tablet Take 800 mg by mouth every 6 (six) hours as needed for pain.      levothyroxine (SYNTHROID, LEVOTHROID) 75 MCG tablet Take 75 mcg by mouth Daily at 6:00 am.      norethindrone-ethinyl estradiol (CYCLAFEM 1/35, 28,) 1-35 mg-mcg per tablet Take 1 tablet by mouth at bedtime.      azithromycin (ZITHROMAX) 250 MG tablet  8/17/2018: Received from: External Pharmacy     metFORMIN (GLUCOPHAGE) 500 MG tablet One tablet with supper for 14 days, if doing well increase to twice daily (breakfast and supper).      phentermine (ADIPEX-P) 37.5 mg tablet Start with 1/2 a tablet daily (18.75mg) for 14 days, if tolerating, go up to one full tablet daily on Day 15 and continue if tolerated.        Past Surgical History  She has a past surgical " "history that includes Anterior cruciate ligament repair (Left); Laparoscopic ovarian cystectomy (08/15/2016); and sebacious cyst removal  (Left).     Examination   /78 (Patient Site: Right Arm, Patient Position: Sitting, Cuff Size: Adult Large)  Pulse (!) 101  Ht 5' 6\" (1.676 m)  Wt (!) 275 lb 8 oz (125 kg)  SpO2 97%  Breastfeeding? No  BMI 44.47 kg/m2  Height: 5' 6\" (1.676 m) (8/17/2018  1:44 PM)  Initial Weight: 275.53 lbs (8/17/2018  1:44 PM)  Weight: 275 lb 8 oz (125 kg) (8/17/2018  1:44 PM)  Weight loss from initial: 0.03 (8/17/2018  1:44 PM)  % Weight loss: 0.01 % (8/17/2018  1:44 PM)  BMI (Calculated): 44.5 (8/17/2018  1:44 PM)  SpO2: 97 % (8/17/2018  1:44 PM)  Waist Circumference (In): 53 Inches (8/17/2018  1:44 PM)  Hip Circumference (In): 53.5 Inches (8/17/2018  1:44 PM)  Neck Circumference (In): 16 Inches (8/17/2018  1:44 PM)  NSAIDS: Yes (8/17/2018  1:44 PM)  General:  Alert and ambulatory, mildly anxious affect  HEENT:  No conjunctival pallor, moist mucous Membranes, neck is supple.  Pulmonary:  Normal respiratory effort, no cough, no audible wheezes/crackles.  CV:  Regular rate and Rhythm, no murmurs, pulses 2 plus  Abdominal: obese, soft, non tender. No masses. Few silvery striae/stretch marks  Extremities: no edema or tremor. No rash  Skin:  No pallor or jaundice  Pscyh/Mood: motivated but a little nervous today.                                    LABS: \"Reviewed and ordered updated labs.    Last recorded labs include:  Lab Results   Component Value Date    WBC 19.8 (H) 04/12/2018    HGB 14.0 04/12/2018    HCT 42.2 04/12/2018    MCV 87 04/12/2018     04/12/2018      No results found for: [id number] No results found for: HGBA1C   No results found for: CHOL No results found for: PTH      No results found for: FERRITIN   No results found for: HDL   No results found for: [id number] No results found for: 68410   No results found for: LDLCALC Lab Results   Component Value Date    TSH " 1.91 2018    No results found for: FOLATE   No results found for: TRIG Lab Results   Component Value Date    ALT 2018    AST 21 2018    ALKPHOS 75 2018    BILITOT 0.3 2018    No results found for: TESTOSTERONE     No components found for: CHOLHDL No results found for: 7597   @resusfast(vitamin a: 1)@       Other Notables/imaging:     Counselin minutes spent in direct consultation with the patient regarding conditions contributing to excess weight accumulation, with over 50% of the time spent in counseling, goal setting and initiating a plan to lose their excess weight.    Leo Matta MD  [facility].  2018

## 2021-06-20 NOTE — PROGRESS NOTES
"Bariatric Clinic Follow-Up Visit:    Viktoria Paulino is a 22 y.o.  female with Body mass index is 43.9 kg/(m^2).  presenting here today for follow-up on non-surgical efforts for weight loss. Original Intake visit occurred on 8/17/18 with a weight of 275lbs and BMI of 44.5.  Along with diet and behavior changes, she has been using phentermine to assist her weight loss goals.  See her intake visit notes for details on identified contributors to weight gain in the past.    Weight:   Wt Readings from Last 2 Encounters:   09/17/18 (!) 272 lb (123.4 kg)   09/06/18 (!) 270 lb (122.5 kg)    pounds  Height: 5' 6\" (1.676 m) (9/17/2018  3:27 PM)  Initial Weight: 275.53 lbs (9/17/2018  3:27 PM)  Weight: 272 lb (123.4 kg) (9/17/2018  3:27 PM)  Weight loss from initial: 3.53 (9/17/2018  3:27 PM)  % Weight loss: 1.28 % (9/17/2018  3:27 PM)  BMI (Calculated): 43.9 (9/17/2018  3:27 PM)  SpO2: 100 % (9/17/2018  3:27 PM)  Waist Circumference (In): 53 Inches (8/17/2018  1:44 PM)  Hip Circumference (In): 53.5 Inches (8/17/2018  1:44 PM)  Neck Circumference (In): 16 Inches (8/17/2018  1:44 PM)  NSAIDS: Yes (8/17/2018  1:44 PM)    Comorbidities:  Patient Active Problem List   Diagnosis     Abdominal pain     Acute gastroenteritis     TIA (transient ischemic attack)       Current Outpatient Prescriptions:      ibuprofen (ADVIL,MOTRIN) 400 MG tablet, Take 800 mg by mouth every 6 (six) hours as needed for pain., Disp: , Rfl:      levothyroxine (SYNTHROID, LEVOTHROID) 75 MCG tablet, Take 75 mcg by mouth Daily at 6:00 am., Disp: , Rfl:      norethindrone-ethinyl estradiol (CYCLAFEM 1/35, 28,) 1-35 mg-mcg per tablet, Take 1 tablet by mouth at bedtime., Disp: , Rfl:      phentermine (ADIPEX-P) 37.5 mg tablet, Half tablet up to one full tablet daily., Disp: 60 tablet, Rfl: 0     metFORMIN (GLUCOPHAGE) 500 MG tablet, One tablet with supper for 14 days, if doing well increase to twice daily (breakfast and supper)., Disp: 120 tablet, Rfl: " 1      Interim: Since our last visit, she has not started using metformin yet. She's back in school. She's tolerating up to one full tab of phentermine and needs refill (no palps. Chronic headaches w/ previous negative workup in the past w/ MRI, BP normal today). She's struggling with financial stress and lack of personal time/fitness due to school starting. We discussed at length making sure she has the bandwidth to focus on weight manangment and will see how the next couple of months go to determine if she can focus on it. She met w/ our dietician since her intake and has a calculated RMR of 2033 kcal/day and a protein goal of  g/day.  She's not been tracking her food and finds her protein goals have been difficult, relying a lot on pasta due to lack of paycheck.  In school for dental hygenist.      Plan:   1.  Diet: reviewed protein and RMR goals, scheduling meals and limiting simple starches  2. Exercise: encourage to have a stress reduction plan  3. Medication: phentermine refilled one more time. If after 2 more months not at least a 6-8 lb reduction, may need to consider alternatives. Hx of PCOS and metformin can start anytime. Labs pending.  4.  Stress Reduction:  Struggling right now. Encouraged many healthful coping strategies for stress.  5. Goals: under 250 lbs is her first short term goal.  6. PCOS hx: start metformin, low carb diet and weight loss should help.  Planning wedding next year and desires improved fertility.  7. Not a surgical candidate at this point in her life due to inadequate time/stress management. Should that change in the next couple of years, then she may be a good candidate. Still too passive/immature at this time.      We discussed [facility] Basics including:  -eating 3 meals daily  -eating protein first  -eating slowly, chewing food well  -avoiding/limiting calorie containing beverages  -We discussed the importance of restorative sleep and stress management in  maintaining a healthy weight.  -We discussed the National Weight Control Registry healthy weight maintenance strategies and ways to optimize metabolism.  -We discussed the importance of physical activity including cardiovascular and strength training in maintaining a healthier weight and explored viable options.    Most recent labs:  Lab Results   Component Value Date    WBC 19.8 (H) 04/12/2018    HGB 14.0 04/12/2018    HCT 42.2 04/12/2018    MCV 87 04/12/2018     04/12/2018     No results found for: CHOL  No results found for: HDL  No results found for: LDLCALC  No results found for: TRIG  No components found for: CHOLHDL  Lab Results   Component Value Date    ALT 21 04/12/2018    AST 21 04/12/2018    ALKPHOS 75 04/12/2018    BILITOT 0.3 04/12/2018     No results found for: HGBA1C  No results found for: [id number]  No results found for: [id number]  No results found for: FERRITIN  No results found for: PTH  No results found for: 51695  No results found for: 7597  Lab Results   Component Value Date    TSH 1.91 04/12/2018     No results found for: TESTOSTERONE    DIETARY HISTORY    Positive Changes Since Last Visit: limited  Struggling With: most things as noted above.    Knowledgeable in Reading Food Labels: met w/ dietician but not tracking yet  Getting Adequate Protein: no  Sleeping 7-8 hours/day sometimes  Stress management poor    PHYSICAL ACTIVITY PATTERNS:  Cardiovascular: no  Strength Training: no    REVIEW OF SYSTEMS  GENERAL/CONSTITUTIONAL:  No illness. Some seasonal allergies for which she's using claritin.  HEENT:   na  CARDIOVASCULAR:   no pain/palps w/ phentermine  PULMONARY:   no ELDER  GASTROINTESTINAL:  No pain  UROLOGIC:  na  NEUROLOGIC:  Headaches started up again w/ school  PSYCHIATRIC:  Stable mood on phentermine  MUSCULOSKELETAL/RHEUMATOLOGIC   no injuries  ENDOCRINE:  Pending labs. Taking synthroid. TSH normal earlier this year. Previously over suppressed.  DERMATOLOGIC:  No  "rash    PHYSICAL EXAM:  Vitals: /76  Pulse 84  Resp 18  Ht 5' 6\" (1.676 m)  Wt (!) 272 lb (123.4 kg)  SpO2 100%  BMI 43.9 kg/m2  Height: 5' 6\" (1.676 m) (9/17/2018  3:27 PM)  Initial Weight: 275.53 lbs (9/17/2018  3:27 PM)  Weight: 272 lb (123.4 kg) (9/17/2018  3:27 PM)  Weight loss from initial: 3.53 (9/17/2018  3:27 PM)  % Weight loss: 1.28 % (9/17/2018  3:27 PM)  BMI (Calculated): 43.9 (9/17/2018  3:27 PM)  SpO2: 100 % (9/17/2018  3:27 PM)  Waist Circumference (In): 53 Inches (8/17/2018  1:44 PM)  Hip Circumference (In): 53.5 Inches (8/17/2018  1:44 PM)  Neck Circumference (In): 16 Inches (8/17/2018  1:44 PM)  NSAIDS: Yes (8/17/2018  1:44 PM)    GEN: Pleasant, well groomed, in no acute distress, passive/shy affect. Accompanied by her Mom.  HEENT: PEERL, EOMI, airway clear .  NECK: No swelling.  HEART: Rhythm regular, rate regular, no murmur   LUNGS: Clear without crackles or wheezes. No cough.  ABDOMEN: obese.  EXTREMITIES: 2 plus palpable peripheral pulses, radial.  NEURO: Alert and Oriented X3, normal gait and speech.  SKIN: No visible rashes.        25 minutes was spent in direct consultation, with over 50% of it spent in counseling regarding their plan for excess weight reduction and health modification.  Leo Matta MD  [facility]  4:15 PM    "

## 2021-06-20 NOTE — PROGRESS NOTES
Non-surgical Weight Loss Initial Diet Evaluation     Assessment:  Pt is a 22 y.o. female being seen today for non-surgical RD nutritional evaluation. Today we reviewed current eating habits and level of physical activity, and instructed on the changes that are required for successful weight loss outcomes.    +pt presents with mom  Personal Goals: would like to lose weight; uncomfortable currently (wedding in oct 2019)  Personal goal weight: 190lbs (was here before and felt great)      Pt Active Problem List Diagnosis:  Patient Active Problem List   Diagnosis     Abdominal pain     Acute gastroenteritis     TIA (transient ischemic attack)     Phentermine: 1 full tab - night plascencia but also helping cravings and energy    Pt's Initial Weight: 275.53 lbs  Weight: 270 lb (122.5 kg)  Weight loss from initial: 5.53  % Weight loss: 2.01 %  BMI: Body mass index is 43.58 kg/(m^2).  IBW: 130 lbs    Estimated RMR ([place]-St Jeor equation): 2033 calories  Protein requirements (.5grams to .9grams per pound IBW, 20-30% of calories, minimum of 60-80gm per day):   grams     Food allergies, intolerances, [employer] customs: upset stomach buttermilk and greek yogurt     Vitamins/Mineral Supplementation: none    Biggest struggle with weight loss:emotional eating   +pt previously lost weight using weight watchers - gained weight plus  +weight increased significantly while on birth control     Who does the grocery shopping for your household? mom  Who prepares your meals at home? mom    Diet Recall/Time: wakes 7/730am  Breakfast: 3 pancakes and milk   Am Snack: none  Lunch: uncrustables w. Fruit or veg w/ chips and dessert/nuts  Pm snack: if hungry will occasional )pop corn)   Dinner: Pro/Veg/CHO  HS Snack: less healthful    -fiance in town will eat out more    Fats used at home: olive oil     Meals per week away from home: 3-4 X    Recommended limiting eating out to no more than 2x/week.  Patient and I reviewed the importance of  eating three consistent meals per day; as well as meal timing to be spaced 4-5 hours apart.  Snack choices: 100-150 calories (1-2x/day if physically hungry), incorporating a fruit/vegetable w/ protein source.    Portion Sizes problematic? no per patient/diet recall  Encouraged slowing meal times down, 20-30 minutes, chewing to applesauce consistency.   To aid in proper portion control and slow meal time down discussed consuming meals off smaller plates, use toddler/children utensils and set utensils down after each bite.    Protein, vegetables/fruits, carbohydrates:   Reviewed lean protein sources today. Recommended consuming 20gm protein at 3 meals daily.  The patient and I discussed the importance of including lean/low fat protein at each meal and limiting carbohydrate intake to less than 25% of plate volume.     Beverages (Type/Oz. per day)  Water: 64oz  Coffee: none  Tea: none  Milk: 32oz  Regular soda: none  Diet soda: none  Juice: none  Jeronimo-Aid/lemonade/etc: none  Alcohol: 1X/month     Discussed the importance of adequate hydration and the goal of 64+ oz of fluid daily.   The patient understands the importance of avoiding all alcoholic and sweetened drinks, and instead choosing 64 oz plain water.    Exercise  Walking     Pt's understands that 45-60 minutes of daily activity is an important part of weight loss success.   Encouraged pt to incorporate upper body strength training exercise, even if its lifting soup cans while watching tv at night, doing push ups/sit-ups, and abdominal work.    PES statement:    1. (NI-1.3)Excessive energy intake related to Food and nutrition related knowledge deficit concerning excessive energy/oral intake as evidenced by Intake of high caloric density foods/beverages (milk) at meals and/or snacks; large portions; frequent grazing; Estimated intake that exceeds estimated daily energy intake; Frequent fast food or restaurant intake; and BMI 43.58    2. (NC-3.3.5) Obese, class III,  BMI ?40 related to physical inactivity as evidenced by Infrequent, low-duration and or low intensity physical activity; and Large amounts of sedentary activities; no structured physical activity regimen    Intervention  Discussion:  1. Educated pt on Eat Better, Move More, Live Well: Non-surgical Weight Loss Handout  2. Recommended to consume 20gm protein at 3 meals daily.  grams daily total.  Educated pt on food labels: keeping total sugar grams <10.  Instructions/Goals:   1. Include 20gm protein at each meal.  2. Increase vegetable/fruit intake, by having a vegetable or fruit with each meal daily. Recommended pt to increase vegetable/fruit intake to 4-5 servings daily.  3. Increase fluid intake to 64oz daily: choose plain or calorie/alcohol-free beverages.  4. Incorporate daily structured activity, 45-60 minutes most days of the week  5. Read food labels more consistently: keeping total fat grams <10, total sugar grams <10, fiber >3gm per serving.  6. Practice plate method: 1/2 plate lean/low fat protein source, vegetable/fruit, <25% of plate complex carbohydrates.  7. Practice eating off of smaller plates/bowls, chewing to applesauce consistency, taking 20-30 minutes to eat in a calm/relaxed environment without distractions of tv/email/cell phone.    Handouts Provided:  Eat Better, Move More, Live Well: Non-surgical Weight Loss Handout  Protein Supplement List    Monitor/Evaluation:    Pt will f/u in one month with bariatrician, and f/u in two months with RD.    Plan for next visit with RD:  GOALS:  1) track and try to include protein into bfast/lunch  2) try out protein supplement?    Time In: 1:50p  Time Out: 2:40p      ABN signed: Yes

## 2021-06-22 NOTE — PROGRESS NOTES
"Bariatric Follow-up    23 y.o.  female BMI:Body mass index is 43.26 kg/m .    Weight:   Wt Readings from Last 1 Encounters:   12/07/18 (!) 268 lb (121.6 kg)    pounds  Height: 5' 6\" (1.676 m) (12/7/2018  9:07 AM)  Initial Weight: 275.53 lbs (12/7/2018  9:07 AM)  Weight: (!) 268 lb (121.6 kg) (12/7/2018  9:07 AM)  Weight loss from initial: 7.53 (12/7/2018  9:07 AM)  % Weight loss: 2.73 % (12/7/2018  9:07 AM)  BMI (Calculated): 43.3 (12/7/2018  9:07 AM)  SpO2: 99 % (12/7/2018  9:07 AM)  Waist Circumference (In): 53 Inches (8/17/2018  1:44 PM)  Hip Circumference (In): 53.5 Inches (8/17/2018  1:44 PM)  Neck Circumference (In): 16 Inches (8/17/2018  1:44 PM)  NSAIDS: Yes (8/17/2018  1:44 PM)      Comorbidities:  Patient Active Problem List   Diagnosis     Abdominal pain     Acute gastroenteritis     TIA (transient ischemic attack)       Interim: 7# down from first visit.     Plan: B-12 while on metformin refill phentermine. Follow up with bariatric dietitian. Discussed PCOS, insulin resistance, glycemic index and importance of physical activity, \"protein first\" approach, pursuing restorative sleep and stress management. Viktoria is aware that this is of life-long importance and that her insulin resistance (as a part of the PCOS clinical picture) was \"inherited\" as opposed to \"earned.\"   -We reviewed her medications and whether associated with weight gain. She is not on medications associated with weight gain.    We discussed [facility] Basics including:  -eating 3 meals daily  -eating protein first  -eating slowly, chewing food well  -avoiding/limiting calorie containing beverages  -choosing wheat, not white with breads, crackers, pastas, meka, bagels, tortillas, rice  -limiting restaurant or cafeteria eating to twice a week or less  -We discussed the importance of restorative sleep and stress management in maintaining a healthy weight.  -We discussed insulin resistance and glycemic index as it relates to appetite " and weight control  -We discussed the National Weight Control Registry healthy weight maintenance strategies and ways to optimize metabolism.  -We discussed the importance of physical activity including cardiovascular and strength training in maintaining a healthier weight and explored viable options.    Most recent labs:  Lab Results   Component Value Date    WBC 11.4 (H) 11/16/2018    HGB 14.3 11/16/2018    HCT 43.4 11/16/2018    MCV 89 11/16/2018     11/16/2018       Lab Results   Component Value Date    ALT 35 11/16/2018    AST 30 11/16/2018    ALKPHOS 72 11/16/2018    BILITOT 0.2 11/16/2018     Lab Results   Component Value Date    HGBA1C 5.4 11/16/2018     Lab Results   Component Value Date    SNRHQDYT46 695 11/16/2018     Lab Results   Component Value Date    QNNQHNTW69OW 43.2 11/16/2018       Lab Results   Component Value Date    TSH 2.76 11/16/2018     DIETARY HISTORY  Meals Per Day: yes  Eating Protein First?: not really  Food Diary: B:scrambled eggs with cheese and ham tomato L:lunchmeat, PB with banana,  D:chile  Snacks Per Day: trying not to,   Typical Snack: cookies lately  Fluid Intake: intentional  Portion Control: improved  Calorie Containing Beverages: none  Alcohol per week: 0-1  Typical Protein Food Choices: variety  Choosing Whole Grains: sometimes  Grocery Shopping is done by: her mom  Food Preparation is done by: her mom  Meals at Restaurant/Cafeteria/Take Out Per Week: 0-1, more with fiance  Eating at the Table: yes  TV is Off During Meals: yes    Positive Changes Since Last Visit: 7# down, couch to 5K  Struggling With: eating out frequently with fiance    Knowledgeable in Reading Food Labels: getting there  Getting Adequate Protein: yes  Sleeping 7-8 hours/day 7-9 hours  Stress management working on it-walking and running,     PHYSICAL ACTIVITY PATTERNS:  Cardiovascular: couch to 5K  Strength Training: none yet    REVIEW OF SYSTEMS  GENERAL/CONSTITUTIONAL:  Fatigue:  "yes  CARDIOVASCULAR:  Chest Pain with Exertion: no  PULMONARY:  Dyspnea on exertion: yes  CPAP Use: no  Tobacco Use: no  Asthma Controlled: NA  GASTROINTESTINAL:  GERD/Heartburn: no  Gallbladder: present  UROLOGIC:  Urinary Symptoms: no  NEUROLOGIC:  Headaches: no  Paresthesias: no  PSYCHIATRIC:  Moods: OK  MUSCULOSKELETAL/RHEUMATOLOGIC  Arthralgias: occ  Myalgias:   ENDOCRINE:  Monitoring Blood Sugars: no  Sugars Well Controlled: yes  DERMATOLOGIC:  Rashes: not currently    PHYSICAL EXAM:  Vitals: /70   Pulse (!) 109   Resp 18   Ht 5' 6\" (1.676 m)   Wt (!) 268 lb (121.6 kg)   SpO2 99%   BMI 43.26 kg/m    Height: 5' 6\" (1.676 m) (12/7/2018  9:07 AM)  Initial Weight: 275.53 lbs (12/7/2018  9:07 AM)  Weight: (!) 268 lb (121.6 kg) (12/7/2018  9:07 AM)  Weight loss from initial: 7.53 (12/7/2018  9:07 AM)  % Weight loss: 2.73 % (12/7/2018  9:07 AM)  BMI (Calculated): 43.3 (12/7/2018  9:07 AM)  SpO2: 99 % (12/7/2018  9:07 AM)  Waist Circumference (In): 53 Inches (8/17/2018  1:44 PM)  Hip Circumference (In): 53.5 Inches (8/17/2018  1:44 PM)  Neck Circumference (In): 16 Inches (8/17/2018  1:44 PM)  NSAIDS: Yes (8/17/2018  1:44 PM)      GEN: Pleasant, well groomed, in no acute distress  EYES: EOMI,  ENT: airway patent  NECK: no carotid bruits, no anterior/supraclavicular lymphadenopathy, thyroid normal   HEART: Rhythm regular, rate regular, no murmur   LUNGS: Clear  ABDOMEN: soft, non-tender, obese, no rashes   VASCULAR: no  lower extremity edema  MUSCULOSKELETAL:  muscle mass OK for age  SKIN:  no color changes of venous stasis, no ulcerations    Time spent with patients 30 minutes, >50% in counseling and coordination of care.        "

## 2021-06-23 NOTE — PROGRESS NOTES
Non-surgical Weight Loss Follow Up Diet Evaluation    Assessment:  This patient is a 23 y.o. female is being seen today for follow-up non-surgical nutritional evaluation. Today we reviewed the patients current eating habits and level of physical activity, and instructed on the changes that are required for successful weight loss outcomes.    Pt's mother present     Phentermine:taking as prescribed; no concerns.     Pt's Initial Weight: 275 lbs  Weight: (!) 264 lb (119.7 kg)  Weight loss from initial: 11  % Weight loss: 4 %    BMI: Body mass index is 42.61 kg/m .  IBW: 130 lbs    Personal goal weight: Just wants to look better.      Pt Active Problem List Diagnosis:     Patient Active Problem List   Diagnosis     Abdominal pain     Acute gastroenteritis     TIA (transient ischemic attack)     Estimated RMR ([place]-St Jeor equation): 2033 calories  Protein requirements (.5grams to .9grams per pound IBW, 20-30% of calories, minimum of 60-80gm per day):  50-90 grams    Progress made since last visit: 6 lb weight loss since last RD visit (9/6/18). Pt has been trying to eat more protein and reducing portion sizes. Pt and pt's financee live at pt's parents home. Pt's mom grocery shops and prepares most meals. Pt is starting dental assisting school.     Concerns: High carbohydrate consumption, poor protein intake, no exercise routine established, poor hydration, and high intake of milk.     Diet Recall/Time: Pt wakes up 8:00 a.m.   Breakfast: 9-10am special K cereal, 2% milk (8g)  Am Snack: none   Lunch: peanut butter and jelly sandwich, fruit (10g)   Pm snack: none   Dinner: skipped- not hungry.   HS Snack: popcorn     Protein: 20 grams    Meals per week away from home: 1-2x/week      Recommended limiting eating out to no more than 2x/week.  Patient and I reviewed the importance of eating three consistent meals per day; as well as meal timing to be spaced 4-5 hours apart.  Snack choices: 100-150 calories (1-2x/day if  physically hungry), incorporating a fruit/vegetable w/ protein source.    Meal Duration: not discussed    Portion Sizes problematic? YES per patient/diet recall  Encouraged slowing meal times down, 20-30 minutes, chewing to applesauce consistency.   To aid in proper portion control and slow meal time down discussed consuming meals off smaller plates, use toddler/children utensils and set utensils down after each bite.    Protein, vegetables/fruits, carbohydrates:   The patient and I discussed the importance of including lean/low fat protein at each meal and limiting carbohydrate intake to less than 25% of plate volume.       Vitamins/Mineral Supplementation: none     Beverages (Type/Oz. per day)  Water: 16 oz   Coffee: none   Tea: none   Milk: 5 glasses of milk     Discussed the importance of adequate hydration and the goal of 64+ oz of fluid daily.   The patient understands the importance of  avoiding all sweetened and alcoholic drinks, and instead choosing 64 oz plain water.    Exercise  Nothing routine established.     Pt's understands that 45-60 minutes of daily activity is an important part of weight loss success.   Encouraged pt to incorporate  strength training exercise in addition to cardiovascular exercise most days of the week.    PES statement:     1. (NI-1.3)Excessive energy intake related to Food and nutrition related knowledge deficit concerning excessive energy/oral intake as evidenced by Intake of high caloric density foods/beverages (milk) at meals and/or snacks; large portions; frequent grazing; Estimated intake that exceeds estimated daily energy intake; Frequent fast food or restaurant intake; and BMI 42.      Intervention:  Discussion:  1. Educated pt on calories and sugar intake of milk.  2. Discussed benefits of reducing milk intake, while aiming for drinking 64 oz of water per day   3. Reviewed lean protein sources.  20-30 gm protein at 3 meals daily. 60-80  grams daily total.  4. Educated pt  on food labels: keeping total fat grams <10, total sugar grams <10, fiber >3gm per serving.  5. Devised custom meal plan; pt filled in with mother on board for grocery shopping.   6. Plate Method: The patient and I discussed the importance of including lean/low  fat protein at each meal and limiting carbohydrate intake to less  than 25% of plate volume.    Instructions/Goals:   1. Include 20-30 gm protein at each meal.  2. Increase vegetable/fruit intake, by having a vegetable or fruit with each meal daily. Recommended pt to increase vegetable/fruit intake to 4-5 servings daily.  3. Increase fluid intake to 64oz daily: choose plain or calorie/alcohol-free beverages.  4. Incorporate daily structured activity, 45-60 minutes most days of the week  5. Read food labels more consistently: keeping total fat grams <10, total sugar grams <10, fiber >3gm per serving.   6. Practice plate method: 1/2 plate lean/low fat protein source, vegetable/fruit, <25% of plate complex carbohydrates.  7. Carbohydrates from grain sources at meal times to be no more than 1 Carb Choice, ie: 15-20 gm total carbohydrate per serving  8. Practice eating off of smaller plates/bowls, chewing to applesauce consistency, taking 20-30 minutes to eat in a calm/relaxed environment without distractions of tv/email/cell phone.    Goals set by patient:  1. Aim for 64 oz water per day  2. Reduce milk consumption 3 glasses per day, try fairlife, 2% to 1%     Handouts Provided:  Protein sources  Plate method  Custom meal plan (P,V,C)    Monitor/Evaluation:    Pt will f/u in one month with bariatrician and RD    Plan for next visit with RD:  Review goals   Review plate method   Educate on food temptations   Review carbohydrates/fiber  Exercise      Time In: 2:00pm  Time Out: 2:30pm      ABN signed: Yes

## 2021-06-23 NOTE — PROGRESS NOTES
"Bariatric Follow-up    23 y.o.  female BMI:Body mass index is 41.67 kg/m .    Weight:   Wt Readings from Last 1 Encounters:   02/08/19 (!) 258 lb 3.2 oz (117.1 kg)    pounds  Height: 5' 6\" (1.676 m) (2/8/2019 12:43 PM)  Initial Weight: 275 lbs (2/8/2019 12:43 PM)  Weight: (!) 258 lb 3.2 oz (117.1 kg) (per pt at home) (2/8/2019 12:43 PM)  Weight loss from initial: 16.8 (2/8/2019 12:43 PM)  % Weight loss: 6.11 % (2/8/2019 12:43 PM)  BMI (Calculated): 41.7 (2/8/2019 12:43 PM)  SpO2: 97 % (2/8/2019 12:43 PM)  Waist Circumference (In): 53 Inches (8/17/2018  1:44 PM)  Hip Circumference (In): 53.5 Inches (8/17/2018  1:44 PM)  Neck Circumference (In): 16 Inches (8/17/2018  1:44 PM)  NSAIDS: Yes (8/17/2018  1:44 PM)      Comorbidities:  Patient Active Problem List   Diagnosis     Abdominal pain     Acute gastroenteritis     TIA (transient ischemic attack)     Interim: 17# down. Going to school and working. Met with the dietitian-once with Jerad and once with Florecita. Feels well. Was walking and using the treadmill but not for the past 2 months.  Her mother was diagnosed with Lymphoma around [place] so things are stressful. Reminded her that lipids have been ordered and they should be done fasting. She works at [employer] so will stop before work.    Plan: Discussed exercise options and the importance of physical activity for wellness. She has done Jerry classes in the past. She has a treamill at home. She likes to walk in the warm weather months.   Refill phentermine. Continue metformin. Lipids. Discussed metabolics. With her mother's lymphoma discussed protecting her sleep, eating well and moderate exercise to avoid immunocompromise.    We discussed [facility] Basics including:  -eating 3 meals daily  -eating protein first  -eating slowly, chewing food well  -avoiding/limiting calorie containing beverages  -choosing wheat, not white with breads, crackers, pastas, meka, bagels, tortillas, rice  -limiting restaurant or " cafeteria eating to twice a week or less  -We discussed the importance of restorative sleep and stress management in maintaining a healthy weight.  -We discussed insulin resistance and glycemic index as it relates to appetite and weight control  -We discussed the National Weight Control Registry healthy weight maintenance strategies and ways to optimize metabolism.  -We discussed the importance of physical activity including cardiovascular and strength training in maintaining a healthier weight and explored viable options.    Most recent labs:  Lab Results   Component Value Date    WBC 11.4 (H) 11/16/2018    HGB 14.3 11/16/2018    HCT 43.4 11/16/2018    MCV 89 11/16/2018     11/16/2018     No results found for: CHOL  No results found for: HDL  No results found for: LDLCALC  No results found for: TRIG  No components found for: CHOLHDL  Lab Results   Component Value Date    ALT 35 11/16/2018    AST 30 11/16/2018    ALKPHOS 72 11/16/2018    BILITOT 0.2 11/16/2018     Lab Results   Component Value Date    HGBA1C 5.4 11/16/2018     Lab Results   Component Value Date    NAAJTRAS66 695 11/16/2018     Lab Results   Component Value Date    [id number] 43.2 11/16/2018     No results found for: FERRITIN  No results found for: PTH  No results found for: 17436  No results found for: 7597  Lab Results   Component Value Date    TSH 2.76 11/16/2018     No results found for: TESTOSTERONE    DIETARY HISTORY  Meals Per Day: 3  Eating Protein First?: yes  Food Diary: B:2 eggs and fairlife milk with Special K L:salad with eggs, cheese, tomatoes, ham, or ham, string cheese D:Ciera [place]  Snacks Per Day: between L and D  And at bedtime  Typical Snack: popcorn, oreos,   Fluid Intake: intentional   Portion Control: improved  Calorie Containing Beverages: no 2% Fairlife milk  Alcohol per week: none  Typical Protein Food Choices: variety  Choosing Whole Grains: no  Grocery Shopping is done by: mom  Food Preparation is done by:  "mom  Meals at Restaurant/Cafeteria/Take Out Per Week: <2  Eating at the Table: yes  TV is Off During Meals: yes    Positive Changes Since Last Visit: 17# down  Struggling With: exercise    Knowledgeable in Reading Food Labels: yes  Getting Adequate Protein: yes  Sleeping 7-8 hours/day 7-8 hours  Stress management moderate to high    PHYSICAL ACTIVITY PATTERNS:  Cardiovascular: before Christmas  Strength Training: never    REVIEW OF SYSTEMS  GENERAL/CONSTITUTIONAL:  Fatigue: yes after work/school  CARDIOVASCULAR:  Chest Pain with Exertion: no  PULMONARY:  Dyspnea on exertion: yes  CPAP Use: no  Tobacco Use: no  Asthma Controlled: NA  GASTROINTESTINAL:  GERD/Heartburn: no  Gallbladder:   UROLOGIC:  Urinary Symptoms: no  NEUROLOGIC:  Headaches: no  Paresthesias: no  PSYCHIATRIC:  Moods: OK  MUSCULOSKELETAL/RHEUMATOLOGIC  Arthralgias: knee  Myalgias: knee  ENDOCRINE:  Monitoring Blood Sugars: no  Sugars Well Controlled: yes  DERMATOLOGIC:  Rashes: no    PHYSICAL EXAM:  Vitals: /70   Pulse 97   Resp 18   Ht 5' 6\" (1.676 m)   Wt (!) 258 lb 3.2 oz (117.1 kg) Comment: per pt at home  SpO2 97%   BMI 41.67 kg/m    Height: 5' 6\" (1.676 m) (2/8/2019 12:43 PM)  Initial Weight: 275 lbs (2/8/2019 12:43 PM)  Weight: (!) 258 lb 3.2 oz (117.1 kg) (per pt at home) (2/8/2019 12:43 PM)  Weight loss from initial: 16.8 (2/8/2019 12:43 PM)  % Weight loss: 6.11 % (2/8/2019 12:43 PM)  BMI (Calculated): 41.7 (2/8/2019 12:43 PM)  SpO2: 97 % (2/8/2019 12:43 PM)  Waist Circumference (In): 53 Inches (8/17/2018  1:44 PM)  Hip Circumference (In): 53.5 Inches (8/17/2018  1:44 PM)  Neck Circumference (In): 16 Inches (8/17/2018  1:44 PM)  NSAIDS: Yes (8/17/2018  1:44 PM)      GEN: Pleasant, well groomed, in no acute distress  EYES: EOMI,  ENT: airway patent  NECK: no carotid bruits, no anterior/supraclavicular lymphadenopathy, thyroid normal   HEART: Rhythm regular, rate regular, no murmur   LUNGS: Clear  ABDOMEN: soft, non-tender, obese, " no rashes   VASCULAR: no  lower extremity edema  MUSCULOSKELETAL:  muscle mass OK  SKIN:  no color changes of venous stasis, no ulcerations    Time spent with patients 30 minutes, >50% in counseling and coordination of care.

## 2021-10-04 ENCOUNTER — HEALTH MAINTENANCE LETTER (OUTPATIENT)
Age: 26
End: 2021-10-04

## 2021-10-18 ENCOUNTER — LAB REQUISITION (OUTPATIENT)
Dept: LAB | Facility: CLINIC | Age: 26
End: 2021-10-18

## 2021-10-18 DIAGNOSIS — Z03.818 ENCOUNTER FOR OBSERVATION FOR SUSPECTED EXPOSURE TO OTHER BIOLOGICAL AGENTS RULED OUT: ICD-10-CM

## 2021-10-18 PROCEDURE — 87081 CULTURE SCREEN ONLY: CPT | Performed by: FAMILY MEDICINE

## 2021-10-22 LAB — BACTERIA SPEC CULT: NORMAL

## 2021-11-16 LAB
HEMOGLOBIN (EXTERNAL): 11.3 G/DL (ref 12–16)
PLATELET COUNT (EXTERNAL): 300 10E3/UL (ref 150–400)

## 2021-12-07 DIAGNOSIS — E66.813 CLASS 3 SEVERE OBESITY DUE TO EXCESS CALORIES WITHOUT SERIOUS COMORBIDITY WITH BODY MASS INDEX (BMI) OF 45.0 TO 49.9 IN ADULT (H): Primary | ICD-10-CM

## 2021-12-07 DIAGNOSIS — E66.01 CLASS 3 SEVERE OBESITY DUE TO EXCESS CALORIES WITHOUT SERIOUS COMORBIDITY WITH BODY MASS INDEX (BMI) OF 45.0 TO 49.9 IN ADULT (H): Primary | ICD-10-CM

## 2021-12-09 ENCOUNTER — LAB (OUTPATIENT)
Dept: URGENT CARE | Facility: URGENT CARE | Age: 26
End: 2021-12-09
Attending: OBSTETRICS & GYNECOLOGY
Payer: COMMERCIAL

## 2021-12-09 DIAGNOSIS — E66.01 CLASS 3 SEVERE OBESITY DUE TO EXCESS CALORIES WITHOUT SERIOUS COMORBIDITY WITH BODY MASS INDEX (BMI) OF 45.0 TO 49.9 IN ADULT (H): ICD-10-CM

## 2021-12-09 DIAGNOSIS — E66.813 CLASS 3 SEVERE OBESITY DUE TO EXCESS CALORIES WITHOUT SERIOUS COMORBIDITY WITH BODY MASS INDEX (BMI) OF 45.0 TO 49.9 IN ADULT (H): ICD-10-CM

## 2021-12-09 PROCEDURE — U0005 INFEC AGEN DETEC AMPLI PROBE: HCPCS

## 2021-12-09 PROCEDURE — U0003 INFECTIOUS AGENT DETECTION BY NUCLEIC ACID (DNA OR RNA); SEVERE ACUTE RESPIRATORY SYNDROME CORONAVIRUS 2 (SARS-COV-2) (CORONAVIRUS DISEASE [COVID-19]), AMPLIFIED PROBE TECHNIQUE, MAKING USE OF HIGH THROUGHPUT TECHNOLOGIES AS DESCRIBED BY CMS-2020-01-R: HCPCS

## 2021-12-10 LAB — SARS-COV-2 RNA RESP QL NAA+PROBE: NEGATIVE

## 2021-12-12 ENCOUNTER — HOSPITAL ENCOUNTER (INPATIENT)
Facility: CLINIC | Age: 26
LOS: 2 days | Discharge: HOME | End: 2021-12-14
Attending: OBSTETRICS & GYNECOLOGY | Admitting: OBSTETRICS & GYNECOLOGY
Payer: COMMERCIAL

## 2021-12-12 DIAGNOSIS — Z3A.40 40 WEEKS GESTATION OF PREGNANCY: Primary | ICD-10-CM

## 2021-12-12 PROBLEM — Z34.90 PREGNANCY: Status: ACTIVE | Noted: 2021-12-12

## 2021-12-12 LAB
ABO/RH(D): NORMAL
ANTIBODY SCREEN: NEGATIVE
BASOPHILS # BLD AUTO: 0.1 10E3/UL (ref 0–0.2)
BASOPHILS NFR BLD AUTO: 0 %
EOSINOPHIL # BLD AUTO: 0.3 10E3/UL (ref 0–0.7)
EOSINOPHIL NFR BLD AUTO: 2 %
ERYTHROCYTE [DISTWIDTH] IN BLOOD BY AUTOMATED COUNT: 14.9 % (ref 10–15)
HCT VFR BLD AUTO: 33.8 % (ref 35–47)
HGB BLD-MCNC: 10.6 G/DL (ref 11.7–15.7)
IMM GRANULOCYTES # BLD: 0.2 10E3/UL
IMM GRANULOCYTES NFR BLD: 1 %
LYMPHOCYTES # BLD AUTO: 3.6 10E3/UL (ref 0.8–5.3)
LYMPHOCYTES NFR BLD AUTO: 25 %
MCH RBC QN AUTO: 25.5 PG (ref 26.5–33)
MCHC RBC AUTO-ENTMCNC: 31.4 G/DL (ref 31.5–36.5)
MCV RBC AUTO: 81 FL (ref 78–100)
MONOCYTES # BLD AUTO: 0.9 10E3/UL (ref 0–1.3)
MONOCYTES NFR BLD AUTO: 6 %
NEUTROPHILS # BLD AUTO: 9.6 10E3/UL (ref 1.6–8.3)
NEUTROPHILS NFR BLD AUTO: 66 %
NRBC # BLD AUTO: 0 10E3/UL
NRBC BLD AUTO-RTO: 0 /100
PLATELET # BLD AUTO: 285 10E3/UL (ref 150–450)
RBC # BLD AUTO: 4.16 10E6/UL (ref 3.8–5.2)
SPECIMEN EXPIRATION DATE: NORMAL
WBC # BLD AUTO: 14.7 10E3/UL (ref 4–11)

## 2021-12-12 PROCEDURE — 3E0P7VZ INTRODUCTION OF HORMONE INTO FEMALE REPRODUCTIVE, VIA NATURAL OR ARTIFICIAL OPENING: ICD-10-PCS | Performed by: OBSTETRICS & GYNECOLOGY

## 2021-12-12 PROCEDURE — 120N000001 HC R&B MED SURG/OB

## 2021-12-12 PROCEDURE — 86901 BLOOD TYPING SEROLOGIC RH(D): CPT | Performed by: OBSTETRICS & GYNECOLOGY

## 2021-12-12 PROCEDURE — 86780 TREPONEMA PALLIDUM: CPT | Performed by: OBSTETRICS & GYNECOLOGY

## 2021-12-12 PROCEDURE — 36415 COLL VENOUS BLD VENIPUNCTURE: CPT | Performed by: OBSTETRICS & GYNECOLOGY

## 2021-12-12 PROCEDURE — 250N000013 HC RX MED GY IP 250 OP 250 PS 637

## 2021-12-12 PROCEDURE — 85025 COMPLETE CBC W/AUTO DIFF WBC: CPT | Performed by: OBSTETRICS & GYNECOLOGY

## 2021-12-12 RX ORDER — METHYLERGONOVINE MALEATE 0.2 MG/ML
200 INJECTION INTRAVENOUS
Status: DISCONTINUED | OUTPATIENT
Start: 2021-12-12 | End: 2021-12-13 | Stop reason: HOSPADM

## 2021-12-12 RX ORDER — PENICILLIN G 3000000 [IU]/50ML
3 INJECTION, SOLUTION INTRAVENOUS EVERY 4 HOURS
Status: DISCONTINUED | OUTPATIENT
Start: 2021-12-13 | End: 2021-12-13 | Stop reason: HOSPADM

## 2021-12-12 RX ORDER — OXYTOCIN 10 [USP'U]/ML
10 INJECTION, SOLUTION INTRAMUSCULAR; INTRAVENOUS
Status: DISCONTINUED | OUTPATIENT
Start: 2021-12-12 | End: 2021-12-15 | Stop reason: HOSPADM

## 2021-12-12 RX ORDER — SODIUM CHLORIDE, SODIUM LACTATE, POTASSIUM CHLORIDE, CALCIUM CHLORIDE 600; 310; 30; 20 MG/100ML; MG/100ML; MG/100ML; MG/100ML
INJECTION, SOLUTION INTRAVENOUS CONTINUOUS PRN
Status: DISCONTINUED | OUTPATIENT
Start: 2021-12-12 | End: 2021-12-13 | Stop reason: HOSPADM

## 2021-12-12 RX ORDER — LEVOTHYROXINE SODIUM 75 UG/1
75 TABLET ORAL DAILY
COMMUNITY
End: 2023-09-28

## 2021-12-12 RX ORDER — PENICILLIN G POTASSIUM 5000000 [IU]/1
5 INJECTION, POWDER, FOR SOLUTION INTRAMUSCULAR; INTRAVENOUS ONCE
Status: COMPLETED | OUTPATIENT
Start: 2021-12-12 | End: 2021-12-13

## 2021-12-12 RX ORDER — KETOROLAC TROMETHAMINE 30 MG/ML
30 INJECTION, SOLUTION INTRAMUSCULAR; INTRAVENOUS
Status: DISCONTINUED | OUTPATIENT
Start: 2021-12-12 | End: 2021-12-15 | Stop reason: HOSPADM

## 2021-12-12 RX ORDER — NALOXONE HYDROCHLORIDE 0.4 MG/ML
0.4 INJECTION, SOLUTION INTRAMUSCULAR; INTRAVENOUS; SUBCUTANEOUS
Status: DISCONTINUED | OUTPATIENT
Start: 2021-12-12 | End: 2021-12-13 | Stop reason: HOSPADM

## 2021-12-12 RX ORDER — PROCHLORPERAZINE 25 MG
25 SUPPOSITORY, RECTAL RECTAL EVERY 12 HOURS PRN
Status: DISCONTINUED | OUTPATIENT
Start: 2021-12-12 | End: 2021-12-13 | Stop reason: HOSPADM

## 2021-12-12 RX ORDER — NALOXONE HYDROCHLORIDE 0.4 MG/ML
0.2 INJECTION, SOLUTION INTRAMUSCULAR; INTRAVENOUS; SUBCUTANEOUS
Status: DISCONTINUED | OUTPATIENT
Start: 2021-12-12 | End: 2021-12-13 | Stop reason: HOSPADM

## 2021-12-12 RX ORDER — OXYTOCIN/0.9 % SODIUM CHLORIDE 30/500 ML
100-340 PLASTIC BAG, INJECTION (ML) INTRAVENOUS CONTINUOUS PRN
Status: DISCONTINUED | OUTPATIENT
Start: 2021-12-12 | End: 2021-12-15 | Stop reason: HOSPADM

## 2021-12-12 RX ORDER — OXYTOCIN/0.9 % SODIUM CHLORIDE 30/500 ML
340 PLASTIC BAG, INJECTION (ML) INTRAVENOUS CONTINUOUS PRN
Status: DISCONTINUED | OUTPATIENT
Start: 2021-12-12 | End: 2021-12-13 | Stop reason: HOSPADM

## 2021-12-12 RX ORDER — MORPHINE SULFATE 10 MG/ML
10 INJECTION, SOLUTION INTRAMUSCULAR; INTRAVENOUS
Status: DISCONTINUED | OUTPATIENT
Start: 2021-12-12 | End: 2021-12-13 | Stop reason: HOSPADM

## 2021-12-12 RX ORDER — HYDROXYZINE HYDROCHLORIDE 25 MG/1
50-100 TABLET, FILM COATED ORAL
Status: DISCONTINUED | OUTPATIENT
Start: 2021-12-12 | End: 2021-12-13 | Stop reason: HOSPADM

## 2021-12-12 RX ORDER — FENTANYL CITRATE 50 UG/ML
50-100 INJECTION, SOLUTION INTRAMUSCULAR; INTRAVENOUS
Status: DISCONTINUED | OUTPATIENT
Start: 2021-12-12 | End: 2021-12-13 | Stop reason: HOSPADM

## 2021-12-12 RX ORDER — IBUPROFEN 600 MG/1
600 TABLET, FILM COATED ORAL
Status: DISCONTINUED | OUTPATIENT
Start: 2021-12-12 | End: 2021-12-15 | Stop reason: HOSPADM

## 2021-12-12 RX ORDER — ONDANSETRON 4 MG/1
4 TABLET, ORALLY DISINTEGRATING ORAL EVERY 6 HOURS PRN
Status: DISCONTINUED | OUTPATIENT
Start: 2021-12-12 | End: 2021-12-13 | Stop reason: HOSPADM

## 2021-12-12 RX ORDER — METOCLOPRAMIDE HYDROCHLORIDE 5 MG/ML
10 INJECTION INTRAMUSCULAR; INTRAVENOUS EVERY 6 HOURS PRN
Status: DISCONTINUED | OUTPATIENT
Start: 2021-12-12 | End: 2021-12-13 | Stop reason: HOSPADM

## 2021-12-12 RX ORDER — CARBOPROST TROMETHAMINE 250 UG/ML
250 INJECTION, SOLUTION INTRAMUSCULAR
Status: DISCONTINUED | OUTPATIENT
Start: 2021-12-12 | End: 2021-12-13 | Stop reason: HOSPADM

## 2021-12-12 RX ORDER — OXYTOCIN 10 [USP'U]/ML
10 INJECTION, SOLUTION INTRAMUSCULAR; INTRAVENOUS
Status: DISCONTINUED | OUTPATIENT
Start: 2021-12-12 | End: 2021-12-13 | Stop reason: HOSPADM

## 2021-12-12 RX ORDER — PROCHLORPERAZINE MALEATE 10 MG
10 TABLET ORAL EVERY 6 HOURS PRN
Status: DISCONTINUED | OUTPATIENT
Start: 2021-12-12 | End: 2021-12-13 | Stop reason: HOSPADM

## 2021-12-12 RX ORDER — MISOPROSTOL 200 UG/1
400 TABLET ORAL
Status: DISCONTINUED | OUTPATIENT
Start: 2021-12-12 | End: 2021-12-13 | Stop reason: HOSPADM

## 2021-12-12 RX ORDER — METOCLOPRAMIDE 10 MG/1
10 TABLET ORAL EVERY 6 HOURS PRN
Status: DISCONTINUED | OUTPATIENT
Start: 2021-12-12 | End: 2021-12-13 | Stop reason: HOSPADM

## 2021-12-12 RX ORDER — ONDANSETRON 2 MG/ML
4 INJECTION INTRAMUSCULAR; INTRAVENOUS EVERY 6 HOURS PRN
Status: DISCONTINUED | OUTPATIENT
Start: 2021-12-12 | End: 2021-12-13 | Stop reason: HOSPADM

## 2021-12-12 RX ORDER — MISOPROSTOL 200 UG/1
800 TABLET ORAL
Status: DISCONTINUED | OUTPATIENT
Start: 2021-12-12 | End: 2021-12-13 | Stop reason: HOSPADM

## 2021-12-12 RX ADMIN — DINOPROSTONE 10 MG: 10 INSERT VAGINAL at 21:22

## 2021-12-12 ASSESSMENT — ACTIVITIES OF DAILY LIVING (ADL)
CONCENTRATING,_REMEMBERING_OR_MAKING_DECISIONS_DIFFICULTY: NO
DRESSING/BATHING_DIFFICULTY: NO
TOILETING_ISSUES: NO
FALL_HISTORY_WITHIN_LAST_SIX_MONTHS: NO
WALKING_OR_CLIMBING_STAIRS_DIFFICULTY: NO
DOING_ERRANDS_INDEPENDENTLY_DIFFICULTY: NO
DIFFICULTY_EATING/SWALLOWING: NO
HEARING_DIFFICULTY_OR_DEAF: NO
DIFFICULTY_COMMUNICATING: NO
WEAR_GLASSES_OR_BLIND: NO

## 2021-12-13 ENCOUNTER — ANESTHESIA (OUTPATIENT)
Dept: OBGYN | Facility: CLINIC | Age: 26
End: 2021-12-13
Payer: COMMERCIAL

## 2021-12-13 ENCOUNTER — ANESTHESIA EVENT (OUTPATIENT)
Dept: OBGYN | Facility: CLINIC | Age: 26
End: 2021-12-13
Payer: COMMERCIAL

## 2021-12-13 PROCEDURE — 250N000011 HC RX IP 250 OP 636

## 2021-12-13 PROCEDURE — 3E0R3BZ INTRODUCTION OF ANESTHETIC AGENT INTO SPINAL CANAL, PERCUTANEOUS APPROACH: ICD-10-PCS

## 2021-12-13 PROCEDURE — 120N000001 HC R&B MED SURG/OB

## 2021-12-13 PROCEDURE — 722N000001 HC LABOR CARE VAGINAL DELIVERY SINGLE

## 2021-12-13 PROCEDURE — 250N000009 HC RX 250: Performed by: OBSTETRICS & GYNECOLOGY

## 2021-12-13 PROCEDURE — C9803 HOPD COVID-19 SPEC COLLECT: HCPCS

## 2021-12-13 PROCEDURE — 258N000003 HC RX IP 258 OP 636: Performed by: OBSTETRICS & GYNECOLOGY

## 2021-12-13 PROCEDURE — 370N000003 HC ANESTHESIA WARD SERVICE

## 2021-12-13 PROCEDURE — 00HU33Z INSERTION OF INFUSION DEVICE INTO SPINAL CANAL, PERCUTANEOUS APPROACH: ICD-10-PCS

## 2021-12-13 PROCEDURE — 250N000009 HC RX 250

## 2021-12-13 RX ORDER — LIDOCAINE 40 MG/G
CREAM TOPICAL
Status: DISCONTINUED | OUTPATIENT
Start: 2021-12-13 | End: 2021-12-13 | Stop reason: HOSPADM

## 2021-12-13 RX ORDER — OXYTOCIN/0.9 % SODIUM CHLORIDE 30/500 ML
340 PLASTIC BAG, INJECTION (ML) INTRAVENOUS CONTINUOUS PRN
Status: DISCONTINUED | OUTPATIENT
Start: 2021-12-13 | End: 2021-12-15 | Stop reason: HOSPADM

## 2021-12-13 RX ORDER — OXYTOCIN/0.9 % SODIUM CHLORIDE 30/500 ML
1-24 PLASTIC BAG, INJECTION (ML) INTRAVENOUS CONTINUOUS
Status: DISCONTINUED | OUTPATIENT
Start: 2021-12-13 | End: 2021-12-13 | Stop reason: HOSPADM

## 2021-12-13 RX ORDER — BUPIVACAINE HYDROCHLORIDE 2.5 MG/ML
INJECTION, SOLUTION EPIDURAL; INFILTRATION; INTRACAUDAL PRN
Status: DISCONTINUED | OUTPATIENT
Start: 2021-12-13 | End: 2021-12-13

## 2021-12-13 RX ORDER — METHYLERGONOVINE MALEATE 0.2 MG/ML
200 INJECTION INTRAVENOUS
Status: DISCONTINUED | OUTPATIENT
Start: 2021-12-13 | End: 2021-12-15 | Stop reason: HOSPADM

## 2021-12-13 RX ORDER — MISOPROSTOL 200 UG/1
800 TABLET ORAL
Status: DISCONTINUED | OUTPATIENT
Start: 2021-12-13 | End: 2021-12-15 | Stop reason: HOSPADM

## 2021-12-13 RX ORDER — NALBUPHINE HYDROCHLORIDE 10 MG/ML
2.5-5 INJECTION, SOLUTION INTRAMUSCULAR; INTRAVENOUS; SUBCUTANEOUS EVERY 6 HOURS PRN
Status: DISCONTINUED | OUTPATIENT
Start: 2021-12-13 | End: 2021-12-15 | Stop reason: HOSPADM

## 2021-12-13 RX ORDER — IBUPROFEN 800 MG/1
800 TABLET, FILM COATED ORAL EVERY 6 HOURS PRN
Status: DISCONTINUED | OUTPATIENT
Start: 2021-12-14 | End: 2021-12-15 | Stop reason: HOSPADM

## 2021-12-13 RX ORDER — MODIFIED LANOLIN
OINTMENT (GRAM) TOPICAL
Status: DISCONTINUED | OUTPATIENT
Start: 2021-12-13 | End: 2021-12-15 | Stop reason: HOSPADM

## 2021-12-13 RX ORDER — MISOPROSTOL 200 UG/1
400 TABLET ORAL
Status: DISCONTINUED | OUTPATIENT
Start: 2021-12-13 | End: 2021-12-15 | Stop reason: HOSPADM

## 2021-12-13 RX ORDER — SODIUM CHLORIDE, SODIUM LACTATE, POTASSIUM CHLORIDE, CALCIUM CHLORIDE 600; 310; 30; 20 MG/100ML; MG/100ML; MG/100ML; MG/100ML
INJECTION, SOLUTION INTRAVENOUS CONTINUOUS PRN
Status: DISCONTINUED | OUTPATIENT
Start: 2021-12-13 | End: 2021-12-13 | Stop reason: HOSPADM

## 2021-12-13 RX ORDER — BISACODYL 10 MG
10 SUPPOSITORY, RECTAL RECTAL DAILY PRN
Status: DISCONTINUED | OUTPATIENT
Start: 2021-12-13 | End: 2021-12-15 | Stop reason: HOSPADM

## 2021-12-13 RX ORDER — DOCUSATE SODIUM 100 MG/1
100 CAPSULE, LIQUID FILLED ORAL DAILY
Status: DISCONTINUED | OUTPATIENT
Start: 2021-12-14 | End: 2021-12-15 | Stop reason: HOSPADM

## 2021-12-13 RX ORDER — FENTANYL CITRATE-0.9 % NACL/PF 10 MCG/ML
100 PLASTIC BAG, INJECTION (ML) INTRAVENOUS EVERY 5 MIN PRN
Status: DISCONTINUED | OUTPATIENT
Start: 2021-12-13 | End: 2021-12-13 | Stop reason: HOSPADM

## 2021-12-13 RX ORDER — LIDOCAINE HYDROCHLORIDE AND EPINEPHRINE 15; 5 MG/ML; UG/ML
INJECTION, SOLUTION EPIDURAL PRN
Status: DISCONTINUED | OUTPATIENT
Start: 2021-12-13 | End: 2021-12-13

## 2021-12-13 RX ORDER — CARBOPROST TROMETHAMINE 250 UG/ML
250 INJECTION, SOLUTION INTRAMUSCULAR
Status: DISCONTINUED | OUTPATIENT
Start: 2021-12-13 | End: 2021-12-15 | Stop reason: HOSPADM

## 2021-12-13 RX ORDER — ACETAMINOPHEN 325 MG/1
650 TABLET ORAL EVERY 4 HOURS PRN
Status: DISCONTINUED | OUTPATIENT
Start: 2021-12-13 | End: 2021-12-15 | Stop reason: HOSPADM

## 2021-12-13 RX ORDER — HYDROCORTISONE 2.5 %
CREAM (GRAM) TOPICAL 3 TIMES DAILY PRN
Status: DISCONTINUED | OUTPATIENT
Start: 2021-12-13 | End: 2021-12-15 | Stop reason: HOSPADM

## 2021-12-13 RX ORDER — OXYTOCIN 10 [USP'U]/ML
10 INJECTION, SOLUTION INTRAMUSCULAR; INTRAVENOUS
Status: DISCONTINUED | OUTPATIENT
Start: 2021-12-13 | End: 2021-12-15 | Stop reason: HOSPADM

## 2021-12-13 RX ADMIN — LIDOCAINE HYDROCHLORIDE,EPINEPHRINE BITARTRATE 3 ML: 15; .005 INJECTION, SOLUTION EPIDURAL; INFILTRATION; INTRACAUDAL; PERINEURAL at 18:38

## 2021-12-13 RX ADMIN — PENICILLIN G POTASSIUM 5 MILLION UNITS: 5000000 POWDER, FOR SOLUTION INTRAMUSCULAR; INTRAPLEURAL; INTRATHECAL; INTRAVENOUS at 10:08

## 2021-12-13 RX ADMIN — BUPIVACAINE HYDROCHLORIDE 6 ML: 2.5 INJECTION, SOLUTION EPIDURAL; INFILTRATION; INTRACAUDAL at 18:40

## 2021-12-13 RX ADMIN — PENICILLIN G 3 MILLION UNITS: 3000000 INJECTION, SOLUTION INTRAVENOUS at 14:21

## 2021-12-13 RX ADMIN — SODIUM CHLORIDE, POTASSIUM CHLORIDE, SODIUM LACTATE AND CALCIUM CHLORIDE: 600; 310; 30; 20 INJECTION, SOLUTION INTRAVENOUS at 20:47

## 2021-12-13 RX ADMIN — PENICILLIN G 3 MILLION UNITS: 3000000 INJECTION, SOLUTION INTRAVENOUS at 18:14

## 2021-12-13 RX ADMIN — KETOROLAC TROMETHAMINE 30 MG: 30 INJECTION, SOLUTION INTRAMUSCULAR at 21:56

## 2021-12-13 RX ADMIN — Medication 1 MILLI-UNITS/MIN: at 10:16

## 2021-12-13 RX ADMIN — SODIUM CHLORIDE, POTASSIUM CHLORIDE, SODIUM LACTATE AND CALCIUM CHLORIDE 125 ML/HR: 600; 310; 30; 20 INJECTION, SOLUTION INTRAVENOUS at 10:07

## 2021-12-13 RX ADMIN — Medication: at 18:35

## 2021-12-13 NOTE — PROGRESS NOTES
Pt arrives to [facility] for schedule IOL followed by Dr. PANCHO Barry. Pt reports no complications with pregnancy. No HA, Vision changes, LOF or bleeding. +FM. OB resident confirmed vertex presentation by bedside ultrasound. Cervadil was placed at 2121 and tolerated well by pt. SVE resulted in 1/50/-2. Cervix is soft and off centered to left. Plan for rest and starting PNC in AM for GBS prophylaxis. Pt is agreeable with plan.

## 2021-12-13 NOTE — PROGRESS NOTES
BFM Brief Progress Note    S: Patient slept well, contractions regular, rated 4/10  Cervidil removed at 912AM    O: Cervix 3-4/60-70/-1  FHT: 145, moderate variability, accelerations, no decels, Category 1  [place]: contractions Q3-5 min    A/P: As per Dr. Barry, start low dose pitocin.  Anticipate .     This patient was discussed with Dr. Damion Barry, who agrees with the above assessment and plan.    Alanna Ivy, PGY3  [facility]

## 2021-12-13 NOTE — H&P
OBSTETRICS ADMISSION HISTORY & PHYSICAL  DATE OF ADMISSION: 2021  7:57 PM        Assessment and Plan:   Assessment:   Viktoria Siu is a 26 year old  at 40w2d not in labor. Membranes are intact. GBS status is positive (in urine). IOL for recurrent UTIs.   Patient Active Problem List   Diagnosis     Abdominal pain     Acute gastroenteritis     TIA (transient ischemic attack)     Hypothyroidism     Polycystic ovary syndrome     Morbid obesity with BMI of 40.0-44.9, adult (H)     Migraine     Pregnant     Vaginal delivery         PLAN:   1. Admit - see IP orders  2. Start cervidil now  3. GBS: positive. Antibiotics are indicated. Will start penicillin in AM.  4. Comfort plan: epidural   5. Will monitor labor progress along with RN and update attending physician  5.   Will notify Dr Damion Barry.    Patient discussed with attending physician, Dr. Damion Barry , who agrees with the plan.     Jatinder Gonzales MD PGY1 2021  [facility]         Chief Complaint:     IOL       History of Present Illness:     Viktoria Siu is a 26 year old year old  at 40w2d confirmed by 8wk US. Patient received prenatal care with Dr. Damion Barry at [facility].  Presents to the [facility] for induction of labor, indication recurrent UTIs.   They report irregular contractions. She denies fluid leakage. She denies bleeding per vagina. Fetal movement is normal.    Their prenatal course has been complicated by recurrent UTIs. She reports she has been on antibiotics for this for over a month. Remains asymptomatic however. GBS positive in urine.    Hx of hypothyroidism on levothyroxine. Per chart review, thyroid labs have been wnl during pregnancy.     Prenatal labs   Lab Results   Component Value Date    GCPCRT Negative 11/15/2019    HGB 9.3 (L) 2020       GBS was positive in urine           Obstetrical History:     OB History    Para Term  AB  Living   2 1 1 0 0 1   SAB IAB Ectopic Multiple Live Births   0 0 0 0 1      # Outcome Date GA Lbr Steve/2nd Weight Sex Delivery Anes PTL Lv   2 Current            1 Term 08/12/20 41w6d 09:03 / 01:16 3.969 kg (8 lb 12 oz) M Vag-Spont EPI N NICHO      Name: FLACO CHUNG      Apgar1: 8  Apgar5: 9              Immunzations:     Most Recent Immunizations   Administered Date(s) Administered     COVID-19,PF,Moderna 02/18/2021     Tdap this pregnancy?  YES - Date: 9/25  Flu shot this pregnancy?YES - Date: 9/25  COVID vaccine? YES, 2 doses         Past Medical History:     Past Medical History:   Diagnosis Date     Hypothyroidism      Migraine      Morbid obesity with BMI of 40.0-44.9, adult (H)      Polycystic ovary syndrome      Vaginal delivery 8/13/2020            Past Surgical History:     Past Surgical History:   Procedure Laterality Date     ANTERIOR CRUCIATE LIGAMENT REPAIR Left      LAPAROSCOPIC CYSTECTOMY OVARIAN (ONCOLOGY)  08/15/2016    tumor resection of right ovary, pathology pending (8/17/16)     OTHER SURGICAL HISTORY Left     sebacious cyst removal Behind ear            Family History:     Family History   Problem Relation Age of Onset     Cerebrovascular Disease Mother             Social History:   no tobacco use  no alcohol use  no illicit drug use         Medications:   No current facility-administered medications on file prior to encounter.  levothyroxine (SYNTHROID/LEVOTHROID) 75 MCG tablet, Take 75 mcg by mouth daily  Prenatal Vit-Fe Fumarate-FA (PRENATAL MULTIVITAMIN  PLUS IRON) 27-1 MG TABS, Take by mouth daily             Allergies:   Cephalexin         Review of Systems:   CONSTITUTIONAL: no fatigue, no unexpected change in weight  SKIN: no worrisome rashes or lesions  EYES: no acute vision problems or changes  ENT: no ear problems, no mouth problems, no throat problems  RESP: no significant cough, no shortness of breath  CV: no chest pain, no palpitations, no new or worsening peripheral  "edema  GI: no nausea, no vomiting  : no dysuria, no hematuria  NEURO: no weakness, no dizziness, no headaches         Physical Exam:   Vitals:   There were no vitals taken for this visit.  0 lbs 0 oz  Estimated body mass index is 48.92 kg/m  as calculated from the following:    Height as of 8/11/20: 1.651 m (5' 5\").    Weight as of 8/11/20: 133.4 kg (294 lb).    GEN: Awake, alert in no apparent distress   HEENT: grossly normal  RESPIRATORY: clear to auscultation bilaterally, no increased work of breathing  CARDIOVASCULAR: RRR, no murmur  ABDOMEN: gravid  CERVIX: 1-2/50/-2 per RN exam  EXT:  no edema or calf tenderness  Confirmed VTX by Ultrasound? yes    Electronic Fetal Monitoring:  Baseline rate normal  Variability moderate  Accelerations present  Decelerations not present    Assessment: Category I EFM interpretation suggests absence of concern for fetal metabolic acidemia at this time due to moderate variability and accelerations present    Uterine Activity irregular.      Strip reviewed on unit    NST interpretation:  Baseline rate 130 normal  Accelerations present  Decelerations not present  Interpretation: reactive    "

## 2021-12-14 VITALS
HEART RATE: 71 BPM | SYSTOLIC BLOOD PRESSURE: 114 MMHG | OXYGEN SATURATION: 98 % | DIASTOLIC BLOOD PRESSURE: 64 MMHG | RESPIRATION RATE: 16 BRPM | TEMPERATURE: 98.1 F

## 2021-12-14 LAB
HGB BLD-MCNC: 10.2 G/DL (ref 11.7–15.7)
T PALLIDUM AB SER QL: NONREACTIVE

## 2021-12-14 PROCEDURE — 36415 COLL VENOUS BLD VENIPUNCTURE: CPT

## 2021-12-14 PROCEDURE — 85018 HEMOGLOBIN: CPT

## 2021-12-14 PROCEDURE — 250N000013 HC RX MED GY IP 250 OP 250 PS 637

## 2021-12-14 RX ORDER — IBUPROFEN 600 MG/1
600 TABLET, FILM COATED ORAL
Qty: 20 TABLET | Refills: 1 | Status: SHIPPED | OUTPATIENT
Start: 2021-12-14 | End: 2023-09-28

## 2021-12-14 RX ADMIN — IBUPROFEN 800 MG: 800 TABLET, FILM COATED ORAL at 17:53

## 2021-12-14 RX ADMIN — DOCUSATE SODIUM 100 MG: 100 CAPSULE, LIQUID FILLED ORAL at 10:29

## 2021-12-14 RX ADMIN — BENZOCAINE AND LEVOMENTHOL: 200; 5 SPRAY TOPICAL at 06:02

## 2021-12-14 RX ADMIN — IBUPROFEN 800 MG: 800 TABLET, FILM COATED ORAL at 06:00

## 2021-12-14 RX ADMIN — IBUPROFEN 800 MG: 800 TABLET, FILM COATED ORAL at 12:04

## 2021-12-14 RX ADMIN — ACETAMINOPHEN 650 MG: 325 TABLET ORAL at 19:59

## 2021-12-14 NOTE — ANESTHESIA POSTPROCEDURE EVALUATION
Patient: Viktoria Siu    Procedure: * No procedures listed *       Diagnosis:* No pre-op diagnosis entered *  Diagnosis Additional Information: No value filed.    Anesthesia Type:  No value filed.    Note:  Disposition: Inpatient   Postop Pain Control: Uneventful            Sign Out: Well controlled pain   PONV: No   Neuro/Psych: Uneventful            Sign Out: Acceptable/Baseline neuro status   Airway/Respiratory: Uneventful            Sign Out: Acceptable/Baseline resp. status   CV/Hemodynamics: Uneventful            Sign Out: Acceptable CV status; No obvious hypovolemia; No obvious fluid overload   Other NRE: NONE   DID A NON-ROUTINE EVENT OCCUR? No    Event details/Postop Comments:  Neuraxial block has worn off, no residual numbness or weakness. Pain controlled. Denies headache or back pain. No further questions or concerns. Instructed that we are available 24/7 should she have questions pertaining to her epidural.             Last vitals:  Vitals Value Taken Time   BP     Temp     Pulse     Resp     SpO2         Electronically Signed By: Tobi Good MD  December 14, 2021  10:21 AM

## 2021-12-14 NOTE — L&D DELIVERY NOTE
DEL NOTE     BOY 8,9  PLACENTA:  INTACT WITH 3 CORD VESSELS  INTACT PERINEUM  YARA AND VOSSEN  DICTATED

## 2021-12-14 NOTE — PROGRESS NOTES
Visit Date: 2021    LABOR AND DELIVERY NOTE     Sheron Siu is a 26-year-old woman, G2, P1-0-0-1, with an EDC of 12/10/2021.  She is admitted at 40 weeks 2 days for an elective induction of labor.  The prenatal history was essentially uncomplicated.  She comes in now for delivery.    At the time of admission, Cervidil was placed in the cervix.  She contracted lightly throughout the evening.  On the morning of the , a Pitocin drip was begun.  It was run at 2 milliunits throughout the morning.  By 4:00, she was dilated to 7.  Contractions, however, were still somewhat irregular and the Pitocin was increased to 4 milliunits.  Over the span of 2 hours, she went to complete dilatation, pushed for approximately 20 minutes and delivered a healthy infant male over an intact perineum.  Apgars at 1 and 5 minutes were 8 and 9.  Placenta delivered spontaneously intact with 3 cord vessels.    ESTIMATED BLOOD LOSS:  100 mL     PRESENT FOR THE CASE:  Damion Barry MD, and Dr Gonzales.    Damion Barry MD        D: 2021   T: 2021   MT: sruthi    Name:     SUSAN SIU  MRN:      [id number]        Account:    [id number]   :      1995           Visit Date: 2021     Document: [id number]

## 2021-12-14 NOTE — ANESTHESIA PREPROCEDURE EVALUATION
Anesthesia Pre-Procedure Evaluation    Patient: Viktoria Siu   MRN: [id number] : 1995        Preoperative Diagnosis: * No pre-op diagnosis entered *    Procedure : * No procedures listed *          Past Medical History:   Diagnosis Date     Hypothyroidism      Migraine      Morbid obesity with BMI of 40.0-44.9, adult (H)      Polycystic ovary syndrome      Vaginal delivery 2020      Past Surgical History:   Procedure Laterality Date     ANTERIOR CRUCIATE LIGAMENT REPAIR Left      LAPAROSCOPIC CYSTECTOMY OVARIAN (ONCOLOGY)  08/15/2016    tumor resection of right ovary, pathology pending (16)     OTHER SURGICAL HISTORY Left     sebacious cyst removal Behind ear      Allergies   Allergen Reactions     Cephalexin Hives      Social History     Tobacco Use     Smoking status: Never Smoker     Smokeless tobacco: Never Used   Substance Use Topics     Alcohol use: Not Currently     Comment: Alcoholic Drinks/day: occationally       Wt Readings from Last 1 Encounters:   19 122 kg (269 lb)        Anesthesia Evaluation            ROS/MED HX  ENT/Pulmonary:  - neg pulmonary ROS     Neurologic:     (+) TIA,     Cardiovascular:  - neg cardiovascular ROS     METS/Exercise Tolerance:     Hematologic:  - neg hematologic  ROS     Musculoskeletal:  - neg musculoskeletal ROS     GI/Hepatic:  - neg GI/hepatic ROS     Renal/Genitourinary:  - neg Renal ROS     Endo:     (+) thyroid problem, hypothyroidism, Obesity,     Psychiatric/Substance Use:  - neg psychiatric ROS     Infectious Disease:  - neg infectious disease ROS     Malignancy:       Other: Comment: pregnant           Physical Exam    Airway  airway exam normal           Respiratory Devices and Support         Dental           Cardiovascular             Pulmonary                   OUTSIDE LABS:  CBC:   Lab Results   Component Value Date    WBC 14.7 (H) 2021    WBC 11.4 (H) 2018    HGB 10.6 (L) 2021    HGB 9.3 (L) 2020    HCT 33.8  (L) 12/12/2021    HCT 43.4 11/16/2018     12/12/2021     11/16/2018     BMP:   Lab Results   Component Value Date     11/16/2018     04/12/2018    POTASSIUM 4.5 11/16/2018    POTASSIUM 4.0 04/12/2018    CHLORIDE 103 11/16/2018    CHLORIDE 111 (H) 04/12/2018    CO2 23 11/16/2018    CO2 17 (L) 04/12/2018    BUN 12 11/16/2018    BUN 12 04/12/2018    CR 0.79 11/16/2018    CR 0.73 04/12/2018     11/16/2018    GLC 87 04/12/2018     COAGS: No results found for: PTT, INR, FIBR  POC: No results found for: BGM, HCG, HCGS  HEPATIC:   Lab Results   Component Value Date    ALBUMIN 4.1 11/16/2018    PROTTOTAL 7.9 11/16/2018    ALT 35 11/16/2018    AST 30 11/16/2018    ALKPHOS 72 11/16/2018    BILITOTAL 0.2 11/16/2018     OTHER:   Lab Results   Component Value Date    A1C 5.4 11/16/2018    ERICK 10.4 11/16/2018    LIPASE 14 04/12/2018    TSH 3.17 11/22/2019       Anesthesia Plan    ASA Status:  3      Anesthesia Type: Epidural.              Consents    Anesthesia Plan(s) and associated risks, benefits, and realistic alternatives discussed. Questions answered and patient/representative(s) expressed understanding.     - Discussed: Risks, Benefits and Alternatives for the PROCEDURE were discussed     - Discussed with:  Patient         Postoperative Care            Comments:    Other Comments: Patient requests labor epidural.  Chart reviewed, including labs.  I reviewed the options and risks with the patient, including but not limited to: bleeding, infection, damage to tissues under the skin (nerves, muscles, bloodvessels), hypotension, headache, and epidural failure.  The patient's questions were answered and the consent was signed. The patient agrees to elective epidural placement.            Erin Parnell MD

## 2021-12-14 NOTE — PLAN OF CARE
Problem: Adult Inpatient Plan of Care  Goal: Patient-Specific Goal (Individualized)  Outcome: Improving     Vital signs stable, patient ambulating and voiding. Attentive to her infants needs. Pain well managed with toradol at delivery. Attentive  at bedside. Ambulation encouraged. Terrell Bonner RN

## 2021-12-14 NOTE — ANESTHESIA PROCEDURE NOTES
Epidural catheter Procedure Note    Pre-Procedure   Staff -        Anesthesiologist:  Erin Parnell MD       Performed By: anesthesiologist       Location: OB       Procedure Start/Stop Times: 12/13/2021 6:18 PM and 12/13/2021 6:45 PM       Pre-Anesthestic Checklist: patient identified, IV checked, risks and benefits discussed, informed consent, monitors and equipment checked, pre-op evaluation and post-op pain management  Timeout:       Correct Patient: Yes        Correct Procedure: Yes        Correct Site: Yes        Correct Position: Yes   Procedure Documentation  Procedure: epidural catheter       Diagnosis: labor pain       Patient Position: sitting       Patient Prep/Sterile Barriers: sterile gloves, mask, patient draped       Skin prep: Chloraprep       Local skin infiltrated with 3 mL of 1% lidocaine.        Insertion Site: L2-3. (midline approach).       Technique: LORT saline        PRISCA at 6.5 cm.       Needle type: Poly Adaptive.       Needle Gauge: 18.        Needle Length (Inches): 3.5        Catheter: 20 G.         Catheter threaded easily.         8.5 cm epidural space.         Threaded 15 cm at skin.        # of attempts: 2 and  # of redirects:     Assessment/Narrative         Paresthesias: Resolved.      Test dose of 3 mL lidocaine 1.5% w/ 1:200,000 epinephrine at.         Test dose negative, 3 minutes after injection, for signs of intravascular, subdural, or intrathecal injection.       Insertion/Infusion Method: LORT saline       Aspiration negative for Heme or CSF via Epidural Catheter.     Comments:  Prolonged paresthesia at L3/4, completely resolved with removing catheter.  Uncomplicated placement at L2/3 and no paresthesia.

## 2021-12-14 NOTE — PROVIDER NOTIFICATION
PANCHO Barry MD and OB Resident updated on patient status, feeling pressure, temperature, contraction pattern and labor status. Orders received. Terrell Bonner RN

## 2021-12-14 NOTE — PLAN OF CARE
Patient reports tenderness to back. RN noted small pea size redness raised area nondraining in two spots on back at epidural placement with apx 4cm x4cm redness to right of spine. Handoff to dayshift RN. Patient's back wiped clean by this RN and reviewed when and how to contact RN with concerns or questions. Terrell Bonner, RN

## 2021-12-14 NOTE — DISCHARGE SUMMARY
[facility]    Discharge Summary  Obstetrics    Date of Admission:  2021  Date of Discharge:  2021  Discharging Provider: Clark Newman    Discharge Diagnoses   Intrauterine pregnancy at 40 weeks gestation  Vaginal Delivery    History of Present Illness   Viktoria Siu is a 26 year old female who presented for induction and underwent     Hospital Course   The patient's hospital course was unremarkable.  She recovered as anticipated and experienced no post-delivery complications.  On discharge, her pain was well controlled. Vaginal bleeding is similar to peak menstrual flow.  Voiding without difficulty.  Ambulating well and tolerating a normal diet.  No fevers.  Breastfeeding well.  Infant is stable.  She was discharged on post-partum day #1.    Post-partum hemoglobin:   Hemoglobin   Date Value Ref Range Status   2021 10.6 (L) 11.7 - 15.7 g/dL Final       [place] Depression Scale  Thoughts of Harming Self: 0-->never  Total Score: 2      Clark Newman MD    Discharge Disposition   Discharged to home   Condition at discharge: Stable    Primary Care Physician   Fede Fagan    Consultations This Hospital Stay   HOME CARE POST PARTUM/ IP CONSULT  LACTATION IP CONSULT    Discharge Orders      Breast pump - Manual/Electric    Breast Pump Documentation:  Manual/Electric Pump: To support adequate breast milk production and nutrition for infant.     I, the undersigned, certify that the above prescribed supplies are medically necessary for this patient and is both reasonable and necessary in reference to accepted standards of medical and necessary in reference to accepted standards of medical practice in the treatment of this patient's condition and is not prescribed as a convenience.     Discharge Medications   Current Discharge Medication List      CONTINUE these medications which have NOT CHANGED    Details   levothyroxine (SYNTHROID/LEVOTHROID)  75 MCG tablet Take 75 mcg by mouth daily      Prenatal Vit-Fe Fumarate-FA (PRENATAL MULTIVITAMIN  PLUS IRON) 27-1 MG TABS Take by mouth daily           Allergies   Allergies   Allergen Reactions     Cephalexin Hives

## 2021-12-15 NOTE — DISCHARGE INSTRUCTIONS
Postpartum Vaginal Delivery Instructions    Activity       Ask family and friends for help when you need it.    Do not place anything in your vagina for 6 weeks.    You are not restricted on other activities, but take it easy for a few weeks to allow your body to recover from delivery.  You are able to do any activities you feel up to that point.    No driving until you have stopped taking your pain medications (usually two weeks after delivery).     Call your health care provider if you have any of these symptoms:       Increased pain, swelling, redness, or fluid around your stiches from an episiotomy or perineal tear.    A fever above 100.4 F (38 C) with or without chills when placing a thermometer under your tongue.    You soak a sanitary pad with blood within 1 hour, or you see blood clots larger than a golf ball.    Bleeding that lasts more than 6 weeks.    Vaginal discharge that smells bad.    Severe pain, cramping or tenderness in your lower belly area.    A need to urinate more frequently (use the toilet more often), more urgently (use the toilet very quickly), or it burns when you urinate.    Nausea and vomiting.    Redness, swelling or pain around a vein in your leg.    Problems breastfeeding or a red or painful area on your breast.    Chest pain and cough or are gasping for air.    Problems coping with sadness, anxiety, or depression.  If you have any concerns about hurting yourself or the baby, call your provider immediately.     You have questions or concerns after you return home.     Keep your hands clean:  Always wash your hands before touching your perineal area and stitches.  This helps reduce your risk of infection.  If your hands aren't dirty, you may use an alcohol hand-rub to clean your hands. Keep your nails clean and short.          After a Vaginal Birth  After having a baby, your body may be very tired. It can take time to recover from a vaginal delivery. You may stay in the hospital or  birth center from 1 to 4 days. In some cases, you may be able to go home the same day.     Right after the delivery  Your temperature and blood pressure will be taken until they are stable. A nurse or other healthcare provider will watch you as you rest. You may have afterbirth pains. These are cramps caused by the uterus shrinking. Sanitary pads are used to soak up the discharge of the uterine lining. To make sure that you aren t bleeding too much, the pad will be checked. And the firmness of your uterus will be checked. To do this, a nurse will gently push down on your stomach. If you had anesthesia, you ll be watched closely until you can feel and move your toes. If you have pain between your vagina and anus (perineal pain), an ice pack can help.   [place] care  While still in the hospital or birth center, you ll learn how to hold and feed your baby. You will also be given instructions on how to care for your baby. This includes bathing and feeding.    Getting ready to go home  You may be anxious to go home as soon as possible. Before you and your baby go home, a healthcare provider will check to be sure you are healthy enough to take care of your baby and yourself. You re ready to go home when:     You can walk to the bathroom and use the bathroom without help.    You can eat solid food and swallow pills (if needed).    You have no sign of infection or other health problems, including fever.     You have adequate pain control.    You aren't bleeding isn't excessive.    You are able to care for your  and are emotionally stable.  Before going home, you ll be given written instructions for home self-care after vaginal delivery. Follow these instructions carefully. If you have questions or concerns, talk about them now.   If you have stitches  You may have received stitches in the skin near your vagina. The stitches might have closed an incision that enlarged the opening of your vagina (episiotomy). Or you  may have needed stitches to repair torn skin. Either way, your stitches should dissolve in weeks. Until then, it's important to keep the stitches clean. You can help reduce mild pain, aid healing, and reduce your risk of infection. These tips can help:     Gently wipe from front to back after you urinate or have a bowel movement.    After wiping, spray warm water on the area. Or you can have a sitz bath. This means sitting in a tub with a few inches of water in it. Then pat the area dry or use a hairdryer on a cool setting.    Don't use soap or any solution except water on the area.    You can take a shower unless told not to.    Change sanitary pads at least every 2 to 4 hours.    Place cold or heat packs on the area as directed by your healthcare providers or nurses. Keep a thin towel between the pack and your skin.    Sit on firm seats so the stitches pull less.   follow-up  Schedule a  follow-up exam with your healthcare provider for about 6 weeks after delivery. During this exam, your uterus and vaginal area will be checked. Contact your healthcare provider if you think you or your baby are having any problems.   When to call your healthcare provider  Call your healthcare provider right away if you have:     A fever of 100.4  F ( 38.0 C) or higher, or as directed by your provider    Bleeding that needs a new sanitary pad after an hour, or large blood clots    Pain in your vagina that gets worse and isn't eased with medicine    Swelling, discharge, or more pain from vaginal tear or episiotomy    Burning, pain, red streaks, or lumpy areas in your breasts that may occur with flu-like symptoms    Cracks, blisters, or blood on your nipples    Burning or pain when you urinate    Nausea or vomiting    Dizziness or fainting    Feelings of extreme sadness or anxiety, or a feeling that you don t want to be with your baby    Belly pain that isn t eased with medicine    Vaginal discharge that has a bad  odor    No bowel movement for 5 days    Painful urination, or inability to control urination    Redness, warmth, or pain in the lower leg    Chest pain  [employer] last reviewed this educational content on 8/1/2020 2000-2021 [employer]. All rights reserved. This information is not intended as a substitute for professional medical care. Always follow your healthcare professional's instructions.        Understanding Postpartum Depression  It s common for new moms to feel tired and arvizu after having a baby. But if you have very strong feelings of sadness or anger, or have trouble doing your normal daily tasks, you may have a serious mood disorder called postpartum depression. Left untreated, it may stop you from bonding with your baby. It s important to see your healthcare provider right away and get help.   What is postpartum depression?  Postpartum depression is a serious mood disorder that affects some women after giving birth. About 3 in 20 new moms have this condition. Symptoms can include feeling very intense sadness, tiredness, and anxiety. These feelings can become so strong that it s hard to do your normal daily tasks. You may find it hard to take care of yourself and your baby.   Symptoms may start about 1 to 3 weeks after your baby is born. But they can also appear up to a year later. If you or the people around you think you may have postpartum depression, or if you have symptoms for more than 2 weeks, call your healthcare provider right away. Treatment is available.   Is it postpartum depression or the baby blues?  Postpartum depression is different than the  baby blues  (postpartum blues). The baby blues is a common condition that many new moms have soon after giving birth. They may feel sad, arvizu, or anxious. But these feelings go away in about 2 weeks. If you still have these feelings after 2 weeks, or if you start to feel worse at any time, call your healthcare provider. He or she  will evaluate you to see if you have postpartum depression. It s important to get treated right away.   What causes postpartum depression?  Postpartum depression is likely caused by a mix of physical and emotional factors. These include:     Changing hormone levels. Right after you have a baby, your levels of estrogen and progesterone drop. This sudden change may set off depression. Your thyroid hormone levels may also drop at the same time.    Depression. You may be at greater risk if you have a history of depression, or if you are currently being treated for depression.    Extreme tiredness (fatigue). It can take a few weeks to physically recover from giving birth. In addition, most new moms don t get the rest or sleep they need.    Lifestyle issues. Other stressors may also play a role. These can include money, health, or relationship problems, or not getting support from friends or family.  Symptoms of postpartum depression  Symptoms may vary. But they are very intense and may include:    Feeling very tired, with no energy (fatigue)    Crying often or for no reason    Feeling very sad, anxious, or overwhelmed    Being arvizu    Feeling that you can t take care of your baby    Sleeping too much, or not sleeping    Having trouble eating    Having trouble making decisions and focusing    Not being able to do your normal daily tasks    Not being interested in your baby    Not wanting to be around family or friends    Wanting to hurt yourself or your baby  Diagnosing postpartum depression  Early diagnosis and treatment are important. Only a healthcare provider can diagnose you. Call your provider if you have symptoms of depression, or you have trouble doing your normal activities, for longer than 2 weeks.   Your provider will talk with you and ask for a health history. You may be asked to fill out a form that helps to identify depression. You may also have some blood tests done. These are used to find out if your  symptoms may be caused by a thyroid disorder or other health condition.   If not treated, postpartum depression can have serious effects for you and your baby. Women with this condition who are not treated:     Are less likely to breastfeed their baby    May not bond with their baby    Are less likely to take good care of their baby    Are more likely to have problems with their partner    May have thoughts of harming themselves    May have thoughts of harming their baby  Are you at risk for postpartum depression?  You are more at risk for this condition if you have:    A history of depression (including postpartum depression in a past pregnancy)    A family member with depression    A history of alcohol or drug abuse    Had a difficult childbirth, such as a premature birth    A baby with health problems or special needs    No emotional support from your partner, family, or friends    Other stresses in your life, such as money or relationship problems    Mixed feelings about this pregnancy, such as with an unplanned pregnancy  Treatment for postpartum depression  Treatment is available. Your healthcare provider can help you decide on the best treatment option for you. He or she may advise:     Medicine. Antidepressants are the main type of medicine for postpartum depression. These medicines affect the brain chemicals that help control moods. Let your provider know if you are breastfeeding. Most antidepressants are considered safe to use when breastfeeding.    Talk therapy (counseling or psychotherapy). This treatment involves talking with a mental health provider about your feelings. This may be done in private sessions with just you and the provider, or it may be done in a group therapy session.  These treatments can be used alone or together.  Joining a support group for moms with this condition may also help you cope. Check online for support groups in your area, or ask your healthcare provider for suggestions.    Managing postpartum depression  In addition to seeing your healthcare provider and getting treatment, it s important to take care of yourself. Tell your family and friends how you are feeling and what kind of help or support you need. Make sure to also:     Get enough sleep    Eat healthy foods    Rest when your baby takes a nap    Get some light exercise    Don t try to do chores    Ask for and accept any help with meals, shopping, and laundry  To learn more  Find more information at:    [employer] www.postpartum.net/    Office on Women s Health www.womenshealth.gov/  [employer]an last reviewed this educational content on 6/1/2020 2000-2021 [employer]. All rights reserved. This information is not intended as a substitute for professional medical care. Always follow your healthcare professional's instructions.

## 2021-12-15 NOTE — PLAN OF CARE
Problem: Adult Inpatient Plan of Care  Goal: Plan of Care Review  Outcome: Adequate for Discharge     Problem: Adult Inpatient Plan of Care  Goal: Patient-Specific Goal (Individualized)  Outcome: Adequate for Discharge     Problem: Adult Inpatient Plan of Care  Goal: Readiness for Transition of Care  Outcome: Adequate for Discharge     Questions answered, VS WNL. Education materials reviewed and given. Patient appropriate for discharge to home.

## 2022-01-24 ENCOUNTER — MEDICAL CORRESPONDENCE (OUTPATIENT)
Dept: HEALTH INFORMATION MANAGEMENT | Facility: CLINIC | Age: 27
End: 2022-01-24
Payer: COMMERCIAL

## 2022-02-21 ENCOUNTER — MEDICAL CORRESPONDENCE (OUTPATIENT)
Dept: HEALTH INFORMATION MANAGEMENT | Facility: CLINIC | Age: 27
End: 2022-02-21
Payer: COMMERCIAL

## 2022-06-17 ENCOUNTER — MEDICAL CORRESPONDENCE (OUTPATIENT)
Dept: ADMINISTRATIVE | Facility: CLINIC | Age: 27
End: 2022-06-17

## 2022-09-02 ENCOUNTER — LAB REQUISITION (OUTPATIENT)
Dept: LAB | Facility: CLINIC | Age: 27
End: 2022-09-02

## 2022-09-02 DIAGNOSIS — E03.9 HYPOTHYROIDISM, UNSPECIFIED: ICD-10-CM

## 2022-09-02 PROCEDURE — 84443 ASSAY THYROID STIM HORMONE: CPT | Performed by: PHYSICIAN ASSISTANT

## 2022-09-03 LAB — TSH SERPL DL<=0.005 MIU/L-ACNC: 1.7 UIU/ML (ref 0.3–4.2)

## 2022-09-11 ENCOUNTER — HEALTH MAINTENANCE LETTER (OUTPATIENT)
Age: 27
End: 2022-09-11

## 2022-11-30 ENCOUNTER — ALLIED HEALTH/NURSE VISIT (OUTPATIENT)
Dept: PEDIATRICS | Facility: CLINIC | Age: 27
End: 2022-11-30
Payer: COMMERCIAL

## 2022-11-30 DIAGNOSIS — Z23 ENCOUNTER FOR IMMUNIZATION: Primary | ICD-10-CM

## 2022-11-30 PROCEDURE — 99207 PR NO CHARGE NURSE ONLY: CPT

## 2022-11-30 PROCEDURE — 90686 IIV4 VACC NO PRSV 0.5 ML IM: CPT

## 2022-11-30 PROCEDURE — 0124A COVID-19 VACCINE BIVALENT BOOSTER 12+ (PFIZER): CPT

## 2022-11-30 PROCEDURE — 91312 COVID-19 VACCINE BIVALENT BOOSTER 12+ (PFIZER): CPT

## 2022-11-30 PROCEDURE — G0008 ADMIN INFLUENZA VIRUS VAC: HCPCS

## 2023-02-21 ENCOUNTER — LAB REQUISITION (OUTPATIENT)
Dept: LAB | Facility: CLINIC | Age: 28
End: 2023-02-21
Payer: COMMERCIAL

## 2023-02-21 DIAGNOSIS — Z12.4 ENCOUNTER FOR SCREENING FOR MALIGNANT NEOPLASM OF CERVIX: ICD-10-CM

## 2023-02-21 PROCEDURE — G0145 SCR C/V CYTO,THINLAYER,RESCR: HCPCS | Mod: ORL | Performed by: OBSTETRICS & GYNECOLOGY

## 2023-02-24 LAB
BKR LAB AP GYN ADEQUACY: NORMAL
BKR LAB AP GYN INTERPRETATION: NORMAL
BKR LAB AP HPV REFLEX: NORMAL
BKR LAB AP LMP: NORMAL
BKR LAB AP PREVIOUS ABNL DX: NORMAL
BKR LAB AP PREVIOUS ABNORMAL: NORMAL
PATH REPORT.COMMENTS IMP SPEC: NORMAL
PATH REPORT.COMMENTS IMP SPEC: NORMAL
PATH REPORT.RELEVANT HX SPEC: NORMAL

## 2023-06-21 ENCOUNTER — TRANSCRIBE ORDERS (OUTPATIENT)
Dept: VASCULAR SURGERY | Facility: CLINIC | Age: 28
End: 2023-06-21
Payer: COMMERCIAL

## 2023-06-21 DIAGNOSIS — I83.819 VARICOSE VEINS OF LEG WITH PAIN: Primary | ICD-10-CM

## 2023-06-30 ENCOUNTER — LAB REQUISITION (OUTPATIENT)
Dept: LAB | Facility: CLINIC | Age: 28
End: 2023-06-30
Payer: COMMERCIAL

## 2023-06-30 DIAGNOSIS — R30.0 DYSURIA: ICD-10-CM

## 2023-06-30 PROCEDURE — 87086 URINE CULTURE/COLONY COUNT: CPT | Mod: ORL | Performed by: NURSE PRACTITIONER

## 2023-07-02 LAB — BACTERIA UR CULT: NORMAL

## 2023-07-12 NOTE — PATIENT INSTRUCTIONS
Viktoria     Thank you for entrusting your care with us at the [facility].     We are prescribing some compression stockings for you. I have included different suppliers that should help you get measured and fitting to ensure proper fitting socks. You should wear these stockings as much as you can. It is especially important to wear them with long periods of standing, sitting, long car rides or if you will be flying. Compression socks should get refilled every 4-6 months. They do not need to be worn at night while in bed. It is recommended to wear compression level of 20-30mmhg or higher from toes to knees.     We will perform an ultrasound today or prior to your appointment with us in 3 months time to evaluate the valves in your veins.     We would like you to follow up in 3 months after wearing socks to see how you are doing.    Varicose Veins      Varicose veins are swollen, enlarged veins most often found in the legs. They are usually blue or purple in color and may bulge, twist, and stand out under the skin.  Normally, veins return blood from the body to the heart. The leg veins have one-way valves that prevent blood from flowing backward in the vein. When the valves are weak or damaged, blood backs up in the veins. This may cause some of the veins to swell and bulge and become varicose veins.      Symptoms    Varicose veins may or may not cause symptoms. If symptoms do occur, they can include:  Legs that feel tired, achy, heavy, or itchy  Leg muscle cramps  Skin changes, such as discoloration, dryness, redness, or rash (in more severe cases, you may also have sores on the skin called venous leg ulcers)    Pain & Discomfort  Pain, itching, swelling, burning, leg heaviness or tiredness, skin discoloration. Symptoms typically worsen throughout the day and are partially relieved by elevation or wearing compression socks or stockings.    Sometimes, varicose veins clot and become painful,  hot, hard and discolored. This is called phlebitis, an uncomfortable but temporary condition that will get better on its own in 2-3 months. Clots associated with phlebitis are limited to surface veins, and not dangerous - unlike clots in the deep veins (deep vein thrombosis or DVT) that are dangerous because they can travel to the heart or lung and require prompt treatment with blood thinners.    Bleeding  A shower or minor trauma can cause a varicose vein to burst and bleed.    Risk Factors    There are a number of factors that increase the risk for varicose veins. These can include:  Being a woman  Being older  Sitting or standing for long periods  Being overweight  Being pregnant  Having a family history of varicose veins    Among other things, veins are responsible for bringing blood back to the heart, sometimes working against gravity. When you walk, muscles in your leg squeeze the veins and help blood flow back into the heart. In normal veins, a series of valves assist this process. With varicose veins and with a related condition called chronic venous insufficiency, poorly functioning valves allow the blood to pool in the lower leg and cause symptoms.     Treatment begins with simple self-help measures (see below). If these don t help, there are many procedures that can be done to shrink or remove varicose veins. Your healthcare provider can tell you more about these options, if needed.    Home care  Support or compression stockings will likely be prescribed. If so, be sure to wear them as directed. They may help improve blood flow.  Exercising helps strengthen your leg muscles and improve blood flow. To get the most benefit, choose exercises such as walking, swimming, or cycling. Also try to exercise for at least 30 minutes on most days.  Raising (elevating) your legs lets gravity help blood flow back to the heart. Sit or lie with your feet above heart level a few times throughout the day, or as  directed.  Avoid long periods of sitting or standing. Change positions often. Also, move your ankles, toes and knees often. This may also help improve blood flow.  If you are overweight, talk with your healthcare provider about setting up a weight-loss plan. Maintaining a healthy weight can help reduce the strain on your veins. It may also improve symptoms, such as swelling and aching.  If you have dryness and itching, ask your provider about special lotions that can be applied to the skin to help improve symptoms.    When to seek medical advice  Call your healthcare provider right away if any of these occur:  Sudden, severe leg swelling, pain, or redness  Symptoms worsen, or they don t improve with self-care  Bleeding from any affected veins  Ulcers form on the legs, ankles, or feet  Fever of 100.4 F (38 C) or higher, or as advised by your provider      Understanding Spider and Varicose Veins    Do you often hide your legs because of the way they look? You may have noticed tiny red or blue bursts (spider veins). Or maybe you have veins that bulge or look twisted (varicose veins). If so, there are treatments that can help    What are the symptoms?  Spider veins or varicose veins may never be a problem. But sometimes they can cause legs to ache or swell. Your legs may also feel heavy and tired, or like they re burning. These symptoms may be more severe at the end of the day. Prolonged sitting or standing can also make your symptoms worse.    Who gets spider and varicose veins?  Anyone can get spider or varicose veins. But vein problems tend to be hereditary (run in families). Other factors that can affect veins include:  Pregnancy, hormones, and birth control pills  A job where you stand or sit a lot  Extra weight or lack of exercise  Age                       What can be done?  Spider and varicose veins can affect the way you feel about yourself. Talk to your healthcare provider about your concerns. There are  treatments that can ease symptoms and make your legs look better.    Your treatment choices  Treatment may include self-care, sclerotherapy (injecting veins with a chemical), surgery, or newer nonsurgical minimally invasive therapies. Spider veins and some varicose veins can be treated with sclerotherapy. Large varicose veins can often be treated with newer minimally invasive procedures and, in rare cases, surgery may be needed.     Please bring your prescription to a home medical supply store. Here is a list of locations but not limited to.     [facility]  [address] [place] [address]  Phone: [phone]  Fax: [phone] [facility]  [address] [place] [address]  Phone: [phone]  Fax: [phone]   [facility]  [address] [place] [address]  Phone: [phone]  Fax: [phone] [facility]  [address]  [address] [place] [address]  Phone: [phone]  Fax: [phone]   [facility]  [address] [place] [address]  Phone: [phone] [facility]   [address] ([facility] [address]  Phone: [phone]   [place]  [address] [place] [address]      Phone: [phone]  Fax: [phone] [place]  [address] [address]      Phone: [phone]  Fax: [phone]     [facility] [email or web address]  [address], [place] [address]  [phone]    [facility]  [email or web address]  [address] [place] [address]  Phone: [phone]     [address] [place] [address]  Phone: [phone]    [address] [facility] [address] [place]  [address]  Phone: [phone] [address], [place] [address]  Phone: [phone] [address], [place] [address]  Phone: [phone]    [facility] [email or web address]  [address] [place] [address]  Phone: [phone] [address], [place] [address]  Phone: [phone] [address] [place] [address]  Phone: [phone]    [employer]  www.[employer]  1-372.208.7937            Reference: [employer] and SVS- Dr Paul Spring: https://vascular.org/patients-[email or web address]-referring-physicians/conditions/varicose-veins#resource-185      If you have any questions or problems prior to us seeing you at your next scheduled visit please do not hesitate to call us at [phone].    Dr Thomas Wilkinson MD & Mikey VICENTE RN

## 2023-08-01 NOTE — PROGRESS NOTES
[facility]        Patient is here for a consult to discuss varicose vein(s). The patient has varicose veins that are problematic in left legs. Symptoms patient has been experiencing are  pain, aching, and  itching. Patient states their varicose veins are bothersome when  when bumped or touched . Has worsened since 2 pregnancies. Patient has not been wearing compression stockings. Patient  has been using pain medication or anti-inflammatory's. Patient has not had recent imaging on legs done.    Pt is currently taking no meds that would impact our treatment plan.    /80   Pulse 80   Temp 97.9  F (36.6  C) (Oral)   Resp 12   SpO2 100%     The provider has been notified that the patient has no concerns.     Questions patient would like addressed today are: N/A.    Refills are needed: No    Has homecare services and agency name:  No            No

## 2023-08-02 ENCOUNTER — OFFICE VISIT (OUTPATIENT)
Dept: VASCULAR SURGERY | Facility: CLINIC | Age: 28
End: 2023-08-02
Attending: NURSE PRACTITIONER
Payer: COMMERCIAL

## 2023-08-02 VITALS
OXYGEN SATURATION: 100 % | RESPIRATION RATE: 12 BRPM | DIASTOLIC BLOOD PRESSURE: 80 MMHG | TEMPERATURE: 97.9 F | SYSTOLIC BLOOD PRESSURE: 117 MMHG | HEART RATE: 80 BPM

## 2023-08-02 DIAGNOSIS — I83.813 VARICOSE VEINS OF BOTH LOWER EXTREMITIES WITH PAIN: Primary | ICD-10-CM

## 2023-08-02 PROCEDURE — G0463 HOSPITAL OUTPT CLINIC VISIT: HCPCS | Performed by: SPECIALIST

## 2023-08-02 PROCEDURE — 99243 OFF/OP CNSLTJ NEW/EST LOW 30: CPT | Performed by: SPECIALIST

## 2023-08-02 RX ORDER — MINOCYCLINE HYDROCHLORIDE 100 MG/1
1 CAPSULE ORAL EVERY 12 HOURS
COMMUNITY
Start: 2019-07-26 | End: 2023-09-28

## 2023-08-02 ASSESSMENT — PAIN SCALES - GENERAL: PAINLEVEL: MODERATE PAIN (4)

## 2023-08-02 NOTE — PROGRESS NOTES
[facility] Vein Consult      Assessment:     1. spider veins, bilateral   2. spider veins, bilateral     Plan:     1. Treatment options of conservative therapy of stockings use, exercise, weight loss, elevating legs when possible.    2. Script for compression stockings 20-30 mm hg  3. Ultrasound to evaluate legs for incompetency of both deep and superficial system .   4. Surgical treatment, discussed briefly today.  5. Follow up: 3 months.   6. Call for any questions concerns or issues    Subjective:      Viktoria Siu is a 27 year old female  who was referred by Fede Fagan  for evaluation of varicose veins. Symptoms include pain, aching, fatigue, burning, edema, and dermatitis. Patient has history of leg selling, pain and vein issues that have progressed. Pain and symptoms have affected daily living and work activities needing medications. Here for evaluation today. no stocking or compression devic use    Allergies:Cat hair extract and Cephalexin    Past Medical History:   Diagnosis Date    Hypothyroidism     Migraine     Morbid obesity with BMI of 40.0-44.9, adult (H)     Polycystic ovary syndrome     Vaginal delivery 8/13/2020       Past Surgical History:   Procedure Laterality Date    ANTERIOR CRUCIATE LIGAMENT REPAIR Left     LAPAROSCOPIC CYSTECTOMY OVARIAN (ONCOLOGY)  08/15/2016    tumor resection of right ovary, pathology pending (8/17/16)    OTHER SURGICAL HISTORY Left     sebacious cyst removal Behind ear         Current Outpatient Medications:     levothyroxine (SYNTHROID/LEVOTHROID) 75 MCG tablet, Take 75 mcg by mouth daily, Disp: , Rfl:     minocycline (MINOCIN) 100 MG capsule, Take 1 capsule by mouth every 12 hours, Disp: , Rfl:     norethindrone-ethinyl estradiol (ORTHO-NOVUM) 1-35 MG-MCG tablet, Take 1 tablet by mouth daily, Disp: , Rfl:     ibuprofen (ADVIL/MOTRIN) 600 MG tablet, Take 1 tablet (600 mg) by mouth once as needed for other (For mild to moderate pain.), Disp: 20  tablet, Rfl: 1    Prenatal Vit-Fe Fumarate-FA (PRENATAL MULTIVITAMIN  PLUS IRON) 27-1 MG TABS, Take by mouth daily, Disp: , Rfl:      Family History   Problem Relation Age of Onset    Cerebrovascular Disease Mother         reports that she has never smoked. She has never used smokeless tobacco. She reports that she does not currently use alcohol. She reports that she does not use drugs.      Review of Systems:    Pertinent items are noted in HPI.  Patient has symptomatic veins and changes of bilateral legs. These have progressed to the point of causing symptoms on a daily basis. This causes issues with daily activities and chores such as washing dishes, vacuuming, outdoor upkeep, and standing for long lengths of time       Objective:     Vitals:    08/02/23 0755   BP: 117/80   Pulse: 80   Resp: 12   Temp: 97.9  F (36.6  C)   TempSrc: Oral   SpO2: 100%     There is no height or weight on file to calculate BMI.    EXAM:  GENERAL: This is a well-developed 27 year old female who appears her stated age  HEAD: normocephalic  HEENT: Pupils equal and reactive bilaterally  MOUTH: mucus membranes intact. Normal dentation  CARDIAC: RRR without murmur  CHEST/LUNG:  Clear to auscultation bilaterally  ABDOMEN: Soft, nontender, nondistended, no masses noted   NEUROLOGIC: Focally intact, nonfocal, alert and oriented x 3  INTEGUMENT: No open lesions or ulcers  VASCULAR: Pulses intact, symmetrical upper and lower extremities. There areskin changes consistent with chronic venous insufficiency. Varicose veins present in bilateral greater saphenous distribution. Spider veins present bilateral.        Side:: Left  VCSS  PAIN:: Moderate: Daily moderate activity limitation, occasional pain medication  Varicose Veins:: Moderate: Multiple: great saphenous confined to calf and thigh  Venous Edema:: Absent: None  Skin Pigmentation:: Absent: None  Inflamation:: Absent: None  Induration:: Absent: None  Number of active ulcers:: 0  Active ulcer  duration:: None  Active ulcer diameter:: None  Compression Therapy:: Not used or patient not compliant  VCSS Score:: 4  CEAP:: Simple varicose veins only    Imaging:    pending    Thomas Wilkinson MD  General Surgery [phone]  Vascular Surgery [phone]

## 2023-09-20 ENCOUNTER — IMMUNIZATION (OUTPATIENT)
Dept: NURSING | Facility: CLINIC | Age: 28
End: 2023-09-20
Payer: COMMERCIAL

## 2023-09-20 PROCEDURE — 90471 IMMUNIZATION ADMIN: CPT

## 2023-09-20 PROCEDURE — 90686 IIV4 VACC NO PRSV 0.5 ML IM: CPT

## 2023-09-22 ASSESSMENT — ENCOUNTER SYMPTOMS
BREAST MASS: 0
DIZZINESS: 0
FREQUENCY: 0
CONSTIPATION: 0
HEADACHES: 0
SORE THROAT: 0
NERVOUS/ANXIOUS: 0
HEMATOCHEZIA: 0
WEAKNESS: 0
DYSURIA: 0
NAUSEA: 0
HEARTBURN: 0
ARTHRALGIAS: 0
FEVER: 0
COUGH: 0
EYE PAIN: 0
ABDOMINAL PAIN: 0
MYALGIAS: 0
CHILLS: 0
SHORTNESS OF BREATH: 0
DIARRHEA: 0
HEMATURIA: 0
PALPITATIONS: 0
PARESTHESIAS: 0
JOINT SWELLING: 0

## 2023-09-28 ENCOUNTER — OFFICE VISIT (OUTPATIENT)
Dept: FAMILY MEDICINE | Facility: CLINIC | Age: 28
End: 2023-09-28
Payer: COMMERCIAL

## 2023-09-28 VITALS
RESPIRATION RATE: 16 BRPM | TEMPERATURE: 98.2 F | WEIGHT: 253.6 LBS | OXYGEN SATURATION: 100 % | DIASTOLIC BLOOD PRESSURE: 80 MMHG | HEART RATE: 69 BPM | BODY MASS INDEX: 42.25 KG/M2 | HEIGHT: 65 IN | SYSTOLIC BLOOD PRESSURE: 112 MMHG

## 2023-09-28 DIAGNOSIS — L63.9 ALOPECIA AREATA: ICD-10-CM

## 2023-09-28 DIAGNOSIS — Z00.00 ROUTINE GENERAL MEDICAL EXAMINATION AT A HEALTH CARE FACILITY: Primary | ICD-10-CM

## 2023-09-28 DIAGNOSIS — E06.3 HYPOTHYROIDISM DUE TO HASHIMOTO'S THYROIDITIS: ICD-10-CM

## 2023-09-28 DIAGNOSIS — E66.01 MORBID OBESITY WITH BMI OF 40.0-44.9, ADULT (H): ICD-10-CM

## 2023-09-28 PROBLEM — G45.9 TIA (TRANSIENT ISCHEMIC ATTACK): Status: RESOLVED | Noted: 2017-08-17 | Resolved: 2023-09-28

## 2023-09-28 LAB
ALBUMIN SERPL BCG-MCNC: 4.2 G/DL (ref 3.5–5.2)
ALP SERPL-CCNC: 71 U/L (ref 35–104)
ALT SERPL W P-5'-P-CCNC: 18 U/L (ref 0–50)
ANION GAP SERPL CALCULATED.3IONS-SCNC: 12 MMOL/L (ref 7–15)
AST SERPL W P-5'-P-CCNC: 38 U/L (ref 0–45)
BASOPHILS # BLD AUTO: 0 10E3/UL (ref 0–0.2)
BASOPHILS NFR BLD AUTO: 1 %
BILIRUB SERPL-MCNC: 0.2 MG/DL
BUN SERPL-MCNC: 14.4 MG/DL (ref 6–20)
CALCIUM SERPL-MCNC: 9.7 MG/DL (ref 8.6–10)
CHLORIDE SERPL-SCNC: 105 MMOL/L (ref 98–107)
CREAT SERPL-MCNC: 0.69 MG/DL (ref 0.51–0.95)
EGFRCR SERPLBLD CKD-EPI 2021: >90 ML/MIN/1.73M2
EOSINOPHIL # BLD AUTO: 0.4 10E3/UL (ref 0–0.7)
EOSINOPHIL NFR BLD AUTO: 5 %
ERYTHROCYTE [DISTWIDTH] IN BLOOD BY AUTOMATED COUNT: 13.3 % (ref 10–15)
ERYTHROCYTE [SEDIMENTATION RATE] IN BLOOD BY WESTERGREN METHOD: 10 MM/HR (ref 0–20)
FERRITIN SERPL-MCNC: 19 NG/ML (ref 6–175)
GLUCOSE SERPL-MCNC: 83 MG/DL (ref 70–99)
HCO3 SERPL-SCNC: 22 MMOL/L (ref 22–29)
HCT VFR BLD AUTO: 41.2 % (ref 35–47)
HGB BLD-MCNC: 13.6 G/DL (ref 11.7–15.7)
IMM GRANULOCYTES # BLD: 0 10E3/UL
IMM GRANULOCYTES NFR BLD: 0 %
LYMPHOCYTES # BLD AUTO: 2.4 10E3/UL (ref 0.8–5.3)
LYMPHOCYTES NFR BLD AUTO: 33 %
MCH RBC QN AUTO: 28.9 PG (ref 26.5–33)
MCHC RBC AUTO-ENTMCNC: 33 G/DL (ref 31.5–36.5)
MCV RBC AUTO: 88 FL (ref 78–100)
MONOCYTES # BLD AUTO: 0.5 10E3/UL (ref 0–1.3)
MONOCYTES NFR BLD AUTO: 6 %
NEUTROPHILS # BLD AUTO: 4 10E3/UL (ref 1.6–8.3)
NEUTROPHILS NFR BLD AUTO: 55 %
PLATELET # BLD AUTO: 317 10E3/UL (ref 150–450)
POTASSIUM SERPL-SCNC: 4.3 MMOL/L (ref 3.4–5.3)
PROT SERPL-MCNC: 7.5 G/DL (ref 6.4–8.3)
RBC # BLD AUTO: 4.71 10E6/UL (ref 3.8–5.2)
SODIUM SERPL-SCNC: 139 MMOL/L (ref 135–145)
TSH SERPL DL<=0.005 MIU/L-ACNC: 2.48 UIU/ML (ref 0.3–4.2)
VIT D+METAB SERPL-MCNC: 47 NG/ML (ref 20–50)
WBC # BLD AUTO: 7.3 10E3/UL (ref 4–11)

## 2023-09-28 PROCEDURE — 85652 RBC SED RATE AUTOMATED: CPT | Performed by: PHYSICIAN ASSISTANT

## 2023-09-28 PROCEDURE — 80053 COMPREHEN METABOLIC PANEL: CPT | Performed by: PHYSICIAN ASSISTANT

## 2023-09-28 PROCEDURE — 99385 PREV VISIT NEW AGE 18-39: CPT | Performed by: PHYSICIAN ASSISTANT

## 2023-09-28 PROCEDURE — 84403 ASSAY OF TOTAL TESTOSTERONE: CPT | Performed by: PHYSICIAN ASSISTANT

## 2023-09-28 PROCEDURE — 85025 COMPLETE CBC W/AUTO DIFF WBC: CPT | Performed by: PHYSICIAN ASSISTANT

## 2023-09-28 PROCEDURE — 82306 VITAMIN D 25 HYDROXY: CPT | Performed by: PHYSICIAN ASSISTANT

## 2023-09-28 PROCEDURE — 84443 ASSAY THYROID STIM HORMONE: CPT | Performed by: PHYSICIAN ASSISTANT

## 2023-09-28 PROCEDURE — 82728 ASSAY OF FERRITIN: CPT | Performed by: PHYSICIAN ASSISTANT

## 2023-09-28 PROCEDURE — 84270 ASSAY OF SEX HORMONE GLOBUL: CPT | Performed by: PHYSICIAN ASSISTANT

## 2023-09-28 PROCEDURE — 82627 DEHYDROEPIANDROSTERONE: CPT | Performed by: PHYSICIAN ASSISTANT

## 2023-09-28 PROCEDURE — 99214 OFFICE O/P EST MOD 30 MIN: CPT | Mod: 25 | Performed by: PHYSICIAN ASSISTANT

## 2023-09-28 PROCEDURE — 36415 COLL VENOUS BLD VENIPUNCTURE: CPT | Performed by: PHYSICIAN ASSISTANT

## 2023-09-28 RX ORDER — FLUCONAZOLE 150 MG/1
150 TABLET ORAL ONCE
COMMUNITY
Start: 2023-09-27 | End: 2024-10-01

## 2023-09-28 RX ORDER — LEVOTHYROXINE SODIUM 75 UG/1
75 TABLET ORAL DAILY
Qty: 90 TABLET | Refills: 3 | Status: SHIPPED | OUTPATIENT
Start: 2023-09-28 | End: 2024-10-01

## 2023-09-28 RX ORDER — MINOCYCLINE HYDROCHLORIDE 100 MG/1
100 CAPSULE ORAL EVERY 12 HOURS
Qty: 180 CAPSULE | Refills: 0 | Status: SHIPPED | OUTPATIENT
Start: 2023-09-28 | End: 2024-10-01

## 2023-09-28 ASSESSMENT — ENCOUNTER SYMPTOMS
HEMATOCHEZIA: 0
CONSTIPATION: 0
FEVER: 0
NERVOUS/ANXIOUS: 0
HEADACHES: 0
COUGH: 0
DYSURIA: 0
CHILLS: 0
JOINT SWELLING: 0
BREAST MASS: 0
MYALGIAS: 0
WEAKNESS: 0
FREQUENCY: 0
DIZZINESS: 0
ABDOMINAL PAIN: 0
NAUSEA: 0
SHORTNESS OF BREATH: 0
DIARRHEA: 0
ARTHRALGIAS: 0
SORE THROAT: 0
EYE PAIN: 0
PARESTHESIAS: 0
HEMATURIA: 0
PALPITATIONS: 0
HEARTBURN: 0

## 2023-09-28 ASSESSMENT — PAIN SCALES - GENERAL: PAINLEVEL: NO PAIN (0)

## 2023-09-28 NOTE — PROGRESS NOTES
SUBJECTIVE:   CC: Viktoria is an 28 year old who presents for preventive health visit.       9/28/2023     1:11 PM   Additional Questions   Roomed by Kaitlin   Accompanied by Self         9/28/2023     1:11 PM   Patient Reported Additional Medications   Patient reports taking the following new medications None       Healthy Habits:     Getting at least 3 servings of Calcium per day:  Yes    Bi-annual eye exam:  Yes    Dental care twice a year:  NO    Sleep apnea or symptoms of sleep apnea:  None    Diet:  Regular (no restrictions)    Frequency of exercise:  6-7 days/week    Duration of exercise:  Greater than 60 minutes    Taking medications regularly:  Yes    Medication side effects:  Not applicable    Additional concerns today:  No      Today's PHQ-2 Score:       9/28/2023     1:03 PM   PHQ-2 ( 1999 [employer])   Q1: Little interest or pleasure in doing things 0   Q2: Feeling down, depressed or hopeless 1   PHQ-2 Score 1   Q1: Little interest or pleasure in doing things Not at all   Q2: Feeling down, depressed or hopeless Several days   PHQ-2 Score 1                 Hypothyroidism Follow-up    Since last visit, patient describes the following symptoms: weight gain and hair loss      Pt is seeing dermatology for alopecia with positive reponse to localized injections, wondering about other causes of hair loss? Requesting further lab work to be done    She is also using minocycline for ance with positive results    Have you ever done Advance Care Planning? (For example, a Health Directive, POLST, or a discussion with a medical provider or your loved ones about your wishes): No, advance care planning information given to patient to review.  Patient plans to discuss their wishes with loved ones or provider.      Social History     Tobacco Use    Smoking status: Never    Smokeless tobacco: Never   Substance Use Topics    Alcohol use: Not Currently     Comment: Alcoholic Drinks/day: occationally              9/22/2023    10:28  PM   Alcohol Use   Prescreen: >3 drinks/day or >7 drinks/week? No     Reviewed orders with patient.  Reviewed health maintenance and updated orders accordingly - Yes      Breast Cancer Screening:    FHS-7:       9/22/2023    10:30 PM   Breast CA Risk Assessment (FHS-7)   Did any of your first-degree relatives have breast or ovarian cancer? No   Did any of your relatives have bilateral breast cancer? No   Did any man in your family have breast cancer? No   Did any woman in your family have breast and ovarian cancer? No   Did any woman in your family have breast cancer before age 50 y? No   Do you have 2 or more relatives with breast and/or ovarian cancer? No   Do you have 2 or more relatives with breast and/or bowel cancer? No         Pertinent mammograms are reviewed under the imaging tab.    History of abnormal Pap smear: NO - age 21-29 PAP every 3 years recommended      2/21/2023     3:30 PM 11/15/2019     5:00 PM   PAP / HPV   PAP Negative for Intraepithelial Lesion or Malignancy (NILM)  Negative for squamous intraepithelial lesion or malignancy  Electronically signed by Corinne Alvarez CT (ASCP) on 11/18/2019 at  4:12 PM        Reviewed and updated as needed this visit by clinical staff   Tobacco  Allergies  Meds              Reviewed and updated as needed this visit by Provider                     Review of Systems   Constitutional:  Negative for chills and fever.   HENT:  Negative for congestion, ear pain, hearing loss and sore throat.    Eyes:  Negative for pain and visual disturbance.   Respiratory:  Negative for cough and shortness of breath.    Cardiovascular:  Negative for chest pain, palpitations and peripheral edema.   Gastrointestinal:  Negative for abdominal pain, constipation, diarrhea, heartburn, hematochezia and nausea.   Breasts:  Negative for tenderness, breast mass and discharge.   Genitourinary:  Positive for vaginal discharge. Negative for dysuria, frequency, genital sores,  "hematuria, pelvic pain, urgency and vaginal bleeding.   Musculoskeletal:  Negative for arthralgias, joint swelling and myalgias.   Skin:  Negative for rash.   Neurological:  Negative for dizziness, weakness, headaches and paresthesias.   Psychiatric/Behavioral:  Negative for mood changes. The patient is not nervous/anxious.           OBJECTIVE:   /80 (BP Location: Right arm, Patient Position: Sitting, Cuff Size: Adult Large)   Pulse 69   Temp 98.2  F (36.8  C) (Temporal)   Resp 16   Ht 1.66 m (5' 5.35\")   Wt 115 kg (253 lb 9.6 oz)   LMP 09/27/2023 (Exact Date)   SpO2 100%   BMI 41.75 kg/m    Physical Exam  GENERAL: healthy, alert and no distress  EYES: Eyes grossly normal to inspection, PERRL and conjunctivae and sclerae normal  HENT: ear canals and TM's normal, nose and mouth without ulcers or lesions  NECK: no adenopathy, no asymmetry, masses, or scars and thyroid normal to palpation  RESP: lungs clear to auscultation - no rales, rhonchi or wheezes  CV: regular rate and rhythm, normal S1 S2, no S3 or S4, no murmur, click or rub, no peripheral edema and peripheral pulses strong  ABDOMEN: soft, nontender, no hepatosplenomegaly, no masses and bowel sounds normal  MS: no gross musculoskeletal defects noted, no edema  SKIN: no suspicious lesions or rashes  NEURO: Normal strength and tone, mentation intact and speech normal  PSYCH: mentation appears normal, affect normal/bright        ASSESSMENT/PLAN:   Viktoria was seen today for physical.    Diagnoses and all orders for this visit:    Routine general medical examination at a health care facility  -     minocycline (MINOCIN) 100 MG capsule; Take 1 capsule (100 mg) by mouth every 12 hours    Advised to obtain COVID booster within the next 2 to 3 weeks, flu shot is up-to-date    Hypothyroidism due to Hashimoto's thyroiditis  -     TSH WITH FREE T4 REFLEX; Future  -     TSH WITH FREE T4 REFLEX  -     levothyroxine (SYNTHROID/LEVOTHROID) 75 MCG tablet; Take 1 " "tablet (75 mcg) by mouth daily    No new symptoms to report in regards to hypothyroidism, continue current dose of Synthroid, labs rechecked today and dose will be adjusted as needed      Morbid obesity with BMI of 40.0-44.9, adult (H)    Previously significant weight loss after birth of her second son, knows that her dietary habits with her and her  through some stressful times have caused an increase in weight.  She is committed to lifestyle changes, no further interventions needed at this time, follow-up sooner if any worsening symptoms or desire for medical weight loss    Alopecia areata  -     Vitamin D Deficiency; Future  -     Erythrocyte sedimentation rate auto; Future  -     Ferritin; Future  -     Testosterone Free and Total; Future  -     DHEA sulfate; Future  -     CBC with Platelets & Differential; Future  -     Comprehensive metabolic panel; Future  -     Vitamin D Deficiency  -     Erythrocyte sedimentation rate auto  -     Ferritin  -     Testosterone Free and Total  -     DHEA sulfate  -     CBC with Platelets & Differential  -     Comprehensive metabolic panel    Lab work today to rule out other causes of hair loss, advised to continue seeing dermatology for localized injections.    Other orders  -     REVIEW OF HEALTH MAINTENANCE PROTOCOL ORDERS        Patient has been advised of split billing requirements and indicates understanding: Yes      COUNSELING:  Reviewed preventive health counseling, as reflected in patient instructions       Regular exercise       Healthy diet/nutrition      BMI:   Estimated body mass index is 41.75 kg/m  as calculated from the following:    Height as of this encounter: 1.66 m (5' 5.35\").    Weight as of this encounter: 115 kg (253 lb 9.6 oz).   Weight management plan: Discussed healthy diet and exercise guidelines      She reports that she has never smoked. She has never used smokeless tobacco.          Fede Fagan PA-C  [facility] " [place]

## 2023-09-29 LAB
DHEA-S SERPL-MCNC: 32 UG/DL (ref 35–430)
SHBG SERPL-SCNC: 98 NMOL/L (ref 30–135)

## 2023-10-01 LAB
TESTOST FREE SERPL-MCNC: 0.1 NG/DL
TESTOST SERPL-MCNC: 12 NG/DL (ref 8–60)

## 2024-04-02 ENCOUNTER — LAB REQUISITION (OUTPATIENT)
Dept: LAB | Facility: CLINIC | Age: 29
End: 2024-04-02
Payer: COMMERCIAL

## 2024-04-02 DIAGNOSIS — Z12.4 ENCOUNTER FOR SCREENING FOR MALIGNANT NEOPLASM OF CERVIX: ICD-10-CM

## 2024-04-02 PROCEDURE — G0145 SCR C/V CYTO,THINLAYER,RESCR: HCPCS | Mod: ORL | Performed by: OBSTETRICS & GYNECOLOGY

## 2024-04-02 PROCEDURE — 87624 HPV HI-RISK TYP POOLED RSLT: CPT | Mod: ORL | Performed by: OBSTETRICS & GYNECOLOGY

## 2024-04-08 LAB
HUMAN PAPILLOMA VIRUS 16 DNA: NEGATIVE
HUMAN PAPILLOMA VIRUS 18 DNA: NEGATIVE
HUMAN PAPILLOMA VIRUS FINAL DIAGNOSIS: NORMAL
HUMAN PAPILLOMA VIRUS OTHER HR: NEGATIVE

## 2024-07-18 ENCOUNTER — LAB REQUISITION (OUTPATIENT)
Dept: LAB | Facility: CLINIC | Age: 29
End: 2024-07-18
Payer: COMMERCIAL

## 2024-07-18 DIAGNOSIS — R39.9 UNSPECIFIED SYMPTOMS AND SIGNS INVOLVING THE GENITOURINARY SYSTEM: ICD-10-CM

## 2024-07-18 PROCEDURE — 87086 URINE CULTURE/COLONY COUNT: CPT | Performed by: OBSTETRICS & GYNECOLOGY

## 2024-07-20 LAB — BACTERIA UR CULT: NORMAL

## 2024-08-15 DIAGNOSIS — E06.3 HYPOTHYROIDISM DUE TO HASHIMOTO'S THYROIDITIS: ICD-10-CM

## 2024-08-15 RX ORDER — LEVOTHYROXINE SODIUM 75 UG/1
75 TABLET ORAL DAILY
Qty: 90 TABLET | Refills: 3 | OUTPATIENT
Start: 2024-08-15

## 2024-10-01 ENCOUNTER — OFFICE VISIT (OUTPATIENT)
Dept: FAMILY MEDICINE | Facility: CLINIC | Age: 29
End: 2024-10-01
Payer: COMMERCIAL

## 2024-10-01 VITALS
HEIGHT: 66 IN | DIASTOLIC BLOOD PRESSURE: 86 MMHG | BODY MASS INDEX: 39.58 KG/M2 | TEMPERATURE: 98.8 F | SYSTOLIC BLOOD PRESSURE: 121 MMHG | OXYGEN SATURATION: 98 % | WEIGHT: 246.3 LBS | RESPIRATION RATE: 15 BRPM | HEART RATE: 87 BPM

## 2024-10-01 DIAGNOSIS — E66.813 CLASS 3 OBESITY: ICD-10-CM

## 2024-10-01 DIAGNOSIS — L63.9 ALOPECIA AREATA: ICD-10-CM

## 2024-10-01 DIAGNOSIS — E06.3 HYPOTHYROIDISM DUE TO HASHIMOTO THYROIDITIS: Primary | ICD-10-CM

## 2024-10-01 DIAGNOSIS — E66.01 MORBID OBESITY WITH BMI OF 40.0-44.9, ADULT (H): ICD-10-CM

## 2024-10-01 PROBLEM — L65.9 ALOPECIA: Status: ACTIVE | Noted: 2024-10-01

## 2024-10-01 PROCEDURE — 90656 IIV3 VACC NO PRSV 0.5 ML IM: CPT | Performed by: PHYSICIAN ASSISTANT

## 2024-10-01 PROCEDURE — 84443 ASSAY THYROID STIM HORMONE: CPT | Performed by: PHYSICIAN ASSISTANT

## 2024-10-01 PROCEDURE — 36415 COLL VENOUS BLD VENIPUNCTURE: CPT | Performed by: PHYSICIAN ASSISTANT

## 2024-10-01 PROCEDURE — 99213 OFFICE O/P EST LOW 20 MIN: CPT | Mod: 25 | Performed by: PHYSICIAN ASSISTANT

## 2024-10-01 PROCEDURE — 90471 IMMUNIZATION ADMIN: CPT | Performed by: PHYSICIAN ASSISTANT

## 2024-10-01 RX ORDER — LEVOTHYROXINE SODIUM 75 UG/1
75 TABLET ORAL DAILY
Qty: 90 TABLET | Refills: 3 | Status: SHIPPED | OUTPATIENT
Start: 2024-10-01

## 2024-10-01 SDOH — HEALTH STABILITY: PHYSICAL HEALTH: ON AVERAGE, HOW MANY MINUTES DO YOU ENGAGE IN EXERCISE AT THIS LEVEL?: 60 MIN

## 2024-10-01 SDOH — HEALTH STABILITY: PHYSICAL HEALTH: ON AVERAGE, HOW MANY DAYS PER WEEK DO YOU ENGAGE IN MODERATE TO STRENUOUS EXERCISE (LIKE A BRISK WALK)?: 6 DAYS

## 2024-10-01 ASSESSMENT — SOCIAL DETERMINANTS OF HEALTH (SDOH): HOW OFTEN DO YOU GET TOGETHER WITH FRIENDS OR RELATIVES?: TWICE A WEEK

## 2024-10-01 ASSESSMENT — PAIN SCALES - GENERAL: PAINLEVEL: NO PAIN (0)

## 2024-10-01 NOTE — PROGRESS NOTES
"Preventive Care Visit  [facility]  Fede Fagan PA-C, Physician Assistant  Oct 1, 2024      Assessment & Plan     (E06.3) Hypothyroidism due to Hashimoto thyroiditis  (primary encounter diagnosis)  Comment:   Plan: TSH WITH FREE T4 REFLEX        No new symptoms to report in regards to hypothyroidism, continue current dose of Synthroid, labs rechecked today and dose will be adjusted as needed      (L63.9) Alopecia areata  Comment:   Plan: managed by dermatology    (E66.01,  Z68.41) Morbid obesity with BMI of 40.0-44.9, adult (H)  Comment:   Plan:     (E66.813) Class 3 obesity  Comment:   Plan: working on weight loss, down > 20 lbs since last physical, great plan in place.            BMI  Estimated body mass index is 40.36 kg/m  as calculated from the following:    Height as of this encounter: 1.664 m (5' 5.5\").    Weight as of this encounter: 111.7 kg (246 lb 4.8 oz).       Counseling  Appropriate preventive services were addressed with this patient via screening, questionnaire, or discussion as appropriate for fall prevention, nutrition, physical activity, Tobacco-use cessation, social engagement, weight loss and cognition.  Checklist reviewing preventive services available has been given to the patient.  Reviewed patient's diet, addressing concerns and/or questions.   She is at risk for psychosocial distress and has been provided with information to reduce risk.       FUTURE APPOINTMENTS:       - Follow-up for annual visit or as needed    Jamie Blum is a 29 year old, presenting for the following:  Physical and Refill Request        10/1/2024     4:04 PM   Additional Questions   Roomed by Tara VICENTE         10/1/2024     4:04 PM   Patient Reported Additional Medications   Patient reports taking the following new medications rePHresh        Health Care Directive  Patient does not have a Health Care Directive or Living Will: Discussed advance care planning with patient; however, " patient declined at this time.    HPI    Annual exam was performed/billed at Gyn appt.      Hypothyroidism Follow-up    Since last visit, patient describes the following symptoms: weight loss of 20+ lbs - intentional, doing great!  Working with dermatology on alopecia - autoimmune        10/1/2024   General Health   How would you rate your overall physical health? Good   Feel stress (tense, anxious, or unable to sleep) Only a little      (!) STRESS CONCERN      10/1/2024   Nutrition   Three or more servings of calcium each day? Yes   Diet: Regular (no restrictions)   How many servings of fruit and vegetables per day? (!) 2-3   How many sweetened beverages each day? 0-1            10/1/2024   Exercise   Days per week of moderate/strenous exercise 6 days   Average minutes spent exercising at this level 60 min            10/1/2024   Social Factors   Frequency of gathering with friends or relatives Twice a week   Worry food won't last until get money to buy more No   Food not last or not have enough money for food? No   Do you have housing? (Housing is defined as stable permanent housing and does not include staying ouside in a car, in a tent, in an abandoned building, in an overnight shelter, or couch-surfing.) Yes   Are you worried about losing your housing? No   Lack of transportation? No   Unable to get utilities (heat,electricity)? No            10/1/2024   Dental   Dentist two times every year? Yes            10/1/2024   TB Screening   Were you born outside of the [place]? No      Today's PHQ-2 Score:       10/1/2024     3:52 PM   PHQ-2 ( 1999 Pfizer)   Q1: Little interest or pleasure in doing things 0   Q2: Feeling down, depressed or hopeless 0   PHQ-2 Score 0   Q1: Little interest or pleasure in doing things Not at all   Q2: Feeling down, depressed or hopeless Not at all   PHQ-2 Score 0           10/1/2024   Substance Use   Alcohol more than 3/day or more than 7/wk No   Do you use any other substances recreationally?  "No        Social History     Tobacco Use    Smoking status: Never    Smokeless tobacco: Never   Vaping Use    Vaping status: Never Used   Substance Use Topics    Alcohol use: Not Currently     Comment: Alcoholic Drinks/day: occationally     Drug use: No           9/22/2023   LAST FHS-7 RESULTS   1st degree relative breast or ovarian cancer No   Any relative bilateral breast cancer No   Any male have breast cancer No   Any ONE woman have BOTH breast AND ovarian cancer No   Any woman with breast cancer before 50yrs No   2 or more relatives with breast AND/OR ovarian cancer No   2 or more relatives with breast AND/OR bowel cancer No                   10/1/2024   STI Screening   New sexual partner(s) since last STI/HIV test? No        History of abnormal Pap smear: No - age 21-29 PAP every 3 years recommended        Latest Ref Rng & Units 4/2/2024     2:23 PM 2/21/2023     3:30 PM 11/15/2019     5:00 PM   PAP / HPV   PAP  Negative for Intraepithelial Lesion or Malignancy (NILM)  Negative for Intraepithelial Lesion or Malignancy (NILM)  Negative for squamous intraepithelial lesion or malignancy  Electronically signed by Corinne Alvarez CT (ASCP) on 11/18/2019 at  4:12 PM      HPV 16 DNA Negative Negative      HPV 18 DNA Negative Negative      Other HR HPV Negative Negative              10/1/2024   Contraception/Family Planning   Questions about contraception or family planning No           Reviewed and updated as needed this visit by Provider                  BP Readings from Last 3 Encounters:   10/01/24 121/86   09/28/23 112/80   08/02/23 117/80    Wt Readings from Last 3 Encounters:   10/01/24 111.7 kg (246 lb 4.8 oz)   09/28/23 115 kg (253 lb 9.6 oz)   07/12/19 122 kg (269 lb)                Objective    Exam  /86 (BP Location: Right arm, Patient Position: Sitting, Cuff Size: Adult Large)   Pulse 87   Temp 98.8  F (37.1  C) (Temporal)   Resp 15   Ht 1.664 m (5' 5.5\")   Wt 111.7 kg (246 lb 4.8 oz)   " "LMP 09/24/2024 (Exact Date)   SpO2 98%   BMI 40.36 kg/m     Estimated body mass index is 40.36 kg/m  as calculated from the following:    Height as of this encounter: 1.664 m (5' 5.5\").    Weight as of this encounter: 111.7 kg (246 lb 4.8 oz).    Physical Exam  GENERAL: alert and no distress  EYES: Eyes grossly normal to inspection, PERRL and conjunctivae and sclerae normal  HENT: ear canals and TM's normal, nose and mouth without ulcers or lesions  NECK: no adenopathy, no asymmetry, masses, or scars  RESP: lungs clear to auscultation - no rales, rhonchi or wheezes  CV: regular rate and rhythm, normal S1 S2, no S3 or S4, no murmur, click or rub, no peripheral edema  ABDOMEN: soft, nontender, no hepatosplenomegaly, no masses and bowel sounds normal  MS: no gross musculoskeletal defects noted, no edema  SKIN: no suspicious lesions or rashes  NEURO: Normal strength and tone, mentation intact and speech normal  PSYCH: mentation appears normal, affect normal/bright        Signed Electronically by: Fede Fagan PA-C    "

## 2024-10-02 LAB — TSH SERPL DL<=0.005 MIU/L-ACNC: 1.29 UIU/ML (ref 0.3–4.2)

## 2025-01-21 ENCOUNTER — TELEPHONE (OUTPATIENT)
Dept: FAMILY MEDICINE | Facility: CLINIC | Age: 30
End: 2025-01-21
Payer: COMMERCIAL

## 2025-01-21 DIAGNOSIS — Z20.828 EXPOSURE TO INFLUENZA: ICD-10-CM

## 2025-01-21 DIAGNOSIS — J11.1 INFLUENZA-LIKE ILLNESS: Primary | ICD-10-CM

## 2025-01-21 RX ORDER — OSELTAMIVIR PHOSPHATE 75 MG/1
75 CAPSULE ORAL 2 TIMES DAILY
Qty: 10 CAPSULE | Refills: 0 | Status: SHIPPED | OUTPATIENT
Start: 2025-01-21 | End: 2025-01-26

## 2025-01-21 NOTE — TELEPHONE ENCOUNTER
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)Sx onset this morning, sore throat, feels febrile - has not yet taken a temp but will do so now  Current temp - 100.0    SADIE Miranda, BSN, PHN, AMB-BC (she/her)  [facility] RN    --------------------    Influenza-Like Illness (WILFREDO) RN Standing Order (13+)    Viktoria Siu      Age: 29 year old     YOB: 1995    Patient has been triaged using Epic triage guidelines: Patient does not need higher level of care     Has the patient been seen at a [facility] or [facility] (established in primary or specialty care) in the last two years? Yes     Does the patient have symptoms or been exposed to a confirmed case of influenza?  Symptoms- patient has WILFREDO symptoms that started <48 hours ago and has had close contact with a confirmed case of influenza within 5 days.     Since this patient has symptoms, does the patient have ANY of the following?  Younger than 5 years of age or age 65 and older No   Chronic pulmonary disease such as asthma or COPD No   Heart disease (CHF, CAD, anticoagulation d/t arrhytmia, congenital heart anomaly) *HTN alone is excluded No   Kidney disease insufficiency No   Hepatic or Hematologic disorder (e.g. chronic liver disease patient, sickle cell disease) No   Diabetes (Type 1 or Type 2) No   Neurologic and Neurodevelopment Conditions (including disorders of the brain, spinal cord, peripheral nerve, and muscle, such as cerebral palsy, epilepsy (seizure disorders), stroke, intellectual disability, moderate to severe developmental delay, muscular dystrophy, or spinal cord injury) No   Obese with BMI >40 Yes   Immunosuppression caused by medications such as those taking prednisone in excess of 20mg daily, or caused by HIV/AIDS with CD4 count more than 200 No   Is pregnant, may be pregnant, or is within two weeks after delivery No   Is a resident of a chronic care facility No   Is the patient  considered a non- Black,  or , or  or  racial or ethnic minority group? No   Is <19 years old and is receiving long term aspirin- or salicylate-containing medications No     Does this patient have ANY of the above conditions? Yes     Do any of the following exclusions apply to the patient?    Patient reports a positive Covid-19 test:   (Encourage at home COVID-19 test)   No, pt declines taking home test   Reported Tamiflu allergy or intolerance    No   Does the patient have a history of CrCl less than or equal to 60 ml/min in the previous 12 months?    CrCl cannot be calculated (Patient's most recent lab result is older than the maximum 365 days allowed.).  *If no CrCl on file, proceed with protocol No   Is the patient taking Probenecid?     (Probencecid & Tamiflu together are not recommended) No     Does this patient have ANY of the above exclusions answered Yes? No     Since this patient has symptoms, do they have ANY of the following conditions?  Chemotherapy or radiation within the last 3 months No   Organ or bone marrow transplant No   Metabolic disorder No   HIV patient with CD4 count <200 No     Does this patient have ANY of the above conditions? No     Wt Readings from Last 1 Encounters:   10/01/24 111.7 kg (246 lb 4.8 oz)     Does last documented weight meet parameters? Yes, Age 1 year and older AND weight is documented within the last 12 months.     RECOMMENDATION:  This patient may benefit from an order of Tamiflu for treatment of WILFREDO symptoms per the standing order.    Inform patient: Your symptoms and exposure are consistent with Influenza. Since you meet the CDC's criteria for high-risk conditions, taking Tamiflu could be beneficial for treating your symptoms. I can send a prescription to your preferred pharmacy. Would you like to proceed? Yes - Inform patient: If your symptoms progress despite Tamiflu, or if you have concerns about the presence of  another infection including coronavirus, please schedule a virtual visit with your provider. RN Proceed to Precautions Reviewed precautions and no additional information is needed.     Use SmartSet Influenza Antiviral Treatment (Symptoms) BID to find suggested Tamiflu order     Use Clinical References to answer questions about Tamiflu (i.e. side effects)     AGE AND DOSING TABLE FOR THE SYMPTOMATIC INFLUENZA PATIENT:  Adult or Pediatric Patients >40kg    Oseltamivir 75mg by mouth twice a day for 5 days        Pediatric Dosing by Weight    If 3 to 9 months old Oral Oseltamivir (Tamiflu) 3mg/kg/dose twice daily for 5 days   If 9 to 12 months old Oral Oseltamivir (Tamiflu) 3.5mg/kg/dose twice daily for 5 days   If > 12 months or older and:    15 kg or less Oral Oseltamivir (Tamiflu) 30mg twice a day for 5 days   > 15 kg to 23 kg Oral Oseltamivir (Tamiflu) 45mg twice a day for 5 days   > 23 kg to 40 kg Oral Oseltamivir (Tamiflu) 60mg twice a day for 5 days   > 40 kg Oral Oseltamivir (Tamiflu) 75mg twice a day for 5 days       Additional educational resources include:  http://www.[employer]  http://www.cdc.gov/flu/  Tod Miranda RN

## 2025-01-22 ENCOUNTER — OFFICE VISIT (OUTPATIENT)
Dept: URGENT CARE | Facility: URGENT CARE | Age: 30
End: 2025-01-22
Payer: COMMERCIAL

## 2025-01-22 VITALS
DIASTOLIC BLOOD PRESSURE: 86 MMHG | BODY MASS INDEX: 39.99 KG/M2 | HEART RATE: 85 BPM | SYSTOLIC BLOOD PRESSURE: 123 MMHG | WEIGHT: 244 LBS | OXYGEN SATURATION: 97 % | TEMPERATURE: 98.7 F

## 2025-01-22 DIAGNOSIS — R07.0 THROAT PAIN: ICD-10-CM

## 2025-01-22 DIAGNOSIS — J02.9 VIRAL PHARYNGITIS: Primary | ICD-10-CM

## 2025-01-22 LAB — DEPRECATED S PYO AG THROAT QL EIA: NEGATIVE

## 2025-01-22 PROCEDURE — 87651 STREP A DNA AMP PROBE: CPT | Performed by: INTERNAL MEDICINE

## 2025-01-23 LAB — S PYO DNA THROAT QL NAA+PROBE: NOT DETECTED

## 2025-01-23 NOTE — PROGRESS NOTES
ASSESSMENT AND PLAN:      ICD-10-CM    1. Viral pharyngitis  J02.9       2. Throat pain  R07.0 Streptococcus A Rapid Screen w/Reflex to PCR     Group A Streptococcus PCR Throat Swab      On Tamiflu.  Discussed influenza and other viruses can cause sore throat.  Strep test is negative.  At this point consistent with viral pharyngitis.  No white spots on her throat but it is red  PLAN:  Salt water gargles    Patient Instructions       Finish tamiflu    sore throat viral.    rapid strep test negative       Return if symptoms worsen or fail to improve.        Rosemary Munoz MD  [facility]    Subjective     Viktroia Siu is a 29 year old who presents for Patient presents with:  Fever  Chills  Pharyngitis    an established patient of [facility].    Here today with concerns of fevers to 100s, chills, low grade fever and sore throat  Called nurse line yesterday & given tamiflu   saw spots in throat  Onset of symptoms was 1 day(s) ago.    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  She was exposed called the nurse line and started on Tamiflu.  Her full family has been treated.  They are traveling to [place] for vacation next week  COVID test negative    Review of Systems        Objective    /86   Pulse 85   Temp 98.7  F (37.1  C) (Oral)   Wt 110.7 kg (244 lb)   SpO2 97%   BMI 39.99 kg/m    Physical Exam  Vitals reviewed.   Constitutional:       Appearance: Normal appearance. She is not ill-appearing.   HENT:      Right Ear: Tympanic membrane normal.      Left Ear: Tympanic membrane normal.      Mouth/Throat:      Mouth: Mucous membranes are moist.      Pharynx: Posterior oropharyngeal erythema present. No oropharyngeal exudate.   Cardiovascular:      Rate  and Rhythm: Normal rate and regular rhythm.      Pulses: Normal pulses.      Heart sounds: Normal heart sounds.   Pulmonary:      Effort: Pulmonary effort is normal.      Breath sounds: Normal breath sounds.   Lymphadenopathy:      Cervical: No cervical adenopathy.   Neurological:      Mental Status: She is alert.            Results for orders placed or performed in visit on 01/22/25 (from the past 24 hours)   Streptococcus A Rapid Screen w/Reflex to PCR    Specimen: Throat; Swab   Result Value Ref Range    Group A Strep antigen Negative Negative

## 2025-02-08 ENCOUNTER — OFFICE VISIT (OUTPATIENT)
Dept: URGENT CARE | Facility: URGENT CARE | Age: 30
End: 2025-02-08
Payer: COMMERCIAL

## 2025-02-08 VITALS
BODY MASS INDEX: 41.27 KG/M2 | OXYGEN SATURATION: 98 % | TEMPERATURE: 98.5 F | SYSTOLIC BLOOD PRESSURE: 121 MMHG | RESPIRATION RATE: 20 BRPM | WEIGHT: 248 LBS | HEART RATE: 77 BPM | DIASTOLIC BLOOD PRESSURE: 80 MMHG

## 2025-02-08 DIAGNOSIS — H10.33 ACUTE CONJUNCTIVITIS OF BOTH EYES, UNSPECIFIED ACUTE CONJUNCTIVITIS TYPE: Primary | ICD-10-CM

## 2025-02-08 PROCEDURE — 99213 OFFICE O/P EST LOW 20 MIN: CPT | Performed by: FAMILY MEDICINE

## 2025-02-08 RX ORDER — OFLOXACIN 3 MG/ML
1-2 SOLUTION/ DROPS OPHTHALMIC 4 TIMES DAILY
Qty: 5 ML | Refills: 0 | Status: SHIPPED | OUTPATIENT
Start: 2025-02-08 | End: 2025-02-13

## 2025-02-08 NOTE — PROGRESS NOTES
Assessment & Plan     Acute conjunctivitis of both eyes, unspecified acute conjunctivitis type  - ofloxacin (OCUFLOX) 0.3 % ophthalmic solution  Dispense: 5 mL; Refill: 0       Patient was discouraged from using any eye contacts during this presentation of nisreen we discussed most of the cases are indeed viral and are self resolving in small cases do require antibiotic therapy we made ofloxacin available given that she does wear eye contacts.  She verbally knowledges to throw away her eye contacts and to not use any until her symptoms resolved completely.    Wes Arroyo MD   [facility]    Subjective     Viktoria is a 29 year old female who presents to clinic today for the following health issues:  Chief Complaint   Patient presents with    Conjunctivitis     Left eye and moving to the right eye started yesterday- has a cough     HPI    Patient last couple weeks was seen by PCP treated for respiratory infection symptoms did improve after antibiotic therapy went to [facility] now returning with a new cough and left eye irritation there is been a small amount of drainage especially after waking up.  Patient does use corrective contact lenses but does not have any eyeglasses.        Patient Active Problem List    Diagnosis Date Noted    Alopecia 10/01/2024     Priority: Medium     Dx 05/2023      Class 3 obesity 10/01/2024     Priority: Medium    Pregnancy 12/12/2021     Priority: Medium    Vaginal delivery 08/13/2020     Priority: Medium    Pregnant 08/12/2020     Priority: Medium    Hypothyroidism      Priority: Medium    Polycystic ovary syndrome      Priority: Medium    Migraine      Priority: Medium       Current Outpatient Medications   Medication Sig Dispense Refill    levothyroxine (SYNTHROID/LEVOTHROID) 75 MCG tablet Take 1 tablet (75 mcg) by mouth daily. 90 tablet 3    norethindrone-ethinyl estradiol (NECON) 0.5-35 MG-MCG tablet Take 1 tablet by mouth daily.      ofloxacin (OCUFLOX) 0.3 %  ophthalmic solution Place 1-2 drops into both eyes 4 times daily for 5 days. 5 mL 0    methylPREDNISolone (MEDROL DOSEPAK) 4 MG tablet therapy pack Follow Package Directions 21 tablet 0     No current facility-administered medications for this visit.           Objective    /80 (BP Location: Right arm, Patient Position: Sitting, Cuff Size: Adult Large)   Pulse 77   Temp 98.5  F (36.9  C) (Temporal)   Resp 20   Wt 112.5 kg (248 lb)   LMP 01/13/2025 (Exact Date)   SpO2 98%   BMI 41.27 kg/m    Physical Exam   As noted above and including:   GEN: NAD  Eyes show mild hyperemia predominately on the left side minimal on the medial section of her right eye there is some dried yellow crusting on the eyelids of her left eye.  No eyelid lesion seen.  Extraocular movements intact.    Lung exam was unremarkable no wheezes rhonchi or crackles  No results found for any visits on 02/08/25.          The use of Dragon/VHX dictation services may have been used to construct the content in this note; any grammatical or spelling errors are non-intentional. Please contact the author of this note directly if you are in need of any clarification.

## 2025-02-08 NOTE — PATIENT INSTRUCTIONS
Ofloxacin eye drops use for 3-5 days to cover for possible bacterial infection    No use of eye contacts until symptoms resolve    Return as needed if symptoms worsen      If this is viral it should resolve over 3-5 days

## 2025-03-03 ENCOUNTER — PATIENT OUTREACH (OUTPATIENT)
Dept: CARE COORDINATION | Facility: CLINIC | Age: 30
End: 2025-03-03
Payer: COMMERCIAL

## 2025-03-15 ENCOUNTER — OFFICE VISIT (OUTPATIENT)
Dept: URGENT CARE | Facility: URGENT CARE | Age: 30
End: 2025-03-15
Payer: COMMERCIAL

## 2025-03-15 VITALS
HEART RATE: 60 BPM | WEIGHT: 242 LBS | DIASTOLIC BLOOD PRESSURE: 73 MMHG | OXYGEN SATURATION: 100 % | TEMPERATURE: 98 F | SYSTOLIC BLOOD PRESSURE: 118 MMHG | BODY MASS INDEX: 40.27 KG/M2 | RESPIRATION RATE: 16 BRPM

## 2025-03-15 DIAGNOSIS — J01.00 ACUTE NON-RECURRENT MAXILLARY SINUSITIS: Primary | ICD-10-CM

## 2025-03-15 PROCEDURE — 99213 OFFICE O/P EST LOW 20 MIN: CPT | Performed by: FAMILY MEDICINE

## 2025-03-15 PROCEDURE — 3078F DIAST BP <80 MM HG: CPT | Performed by: FAMILY MEDICINE

## 2025-03-15 PROCEDURE — 3074F SYST BP LT 130 MM HG: CPT | Performed by: FAMILY MEDICINE

## 2025-03-15 NOTE — PROGRESS NOTES
Assessment & Plan     Acute non-recurrent maxillary sinusitis  - amoxicillin-clavulanate (AUGMENTIN) 875-125 MG tablet  Dispense: 14 tablet; Refill: 0       Ear exam showing clear fluid without any evidence of redness or bulging.  Discussed with her reported sinus pressure and symptoms likely experiencing pressure behind both ears from blockage of the eustachian tube.  A trial of oral Sudafed along with nasal steroid spray was advised that symptoms in regards to her sinuses have not improved in the next week she was given a written prescription for an antibiotic which she can use to treat for possible bacterial sinus infection.       Wes Arroyo MD   [place] [facility]    Subjective     Viktoria is a 29 year old female who presents to clinic today for the following health issues:  Chief Complaint   Patient presents with    Urgent Care     Bilateral ear pain      HPI    Patient is present bilateral ear discomfort also having yellow nasal drainage in the past she has been treated for sinus infections but they are not recurrent.  She has not had any fevers.  No cough.  Denies any sore throat.  She works with children.      Patient Active Problem List    Diagnosis Date Noted    Alopecia 10/01/2024     Priority: Medium     Dx 05/2023      Class 3 obesity 10/01/2024     Priority: Medium    Pregnancy 12/12/2021     Priority: Medium    Vaginal delivery 08/13/2020     Priority: Medium    Pregnant 08/12/2020     Priority: Medium    Hypothyroidism      Priority: Medium    Polycystic ovary syndrome      Priority: Medium    Migraine      Priority: Medium       Current Outpatient Medications   Medication Sig Dispense Refill    amoxicillin-clavulanate (AUGMENTIN) 875-125 MG tablet Take 1 tablet by mouth 2 times daily for 7 days. 14 tablet 0    levothyroxine (SYNTHROID/LEVOTHROID) 75 MCG tablet Take 1 tablet (75 mcg) by mouth daily. 90 tablet 3    methylPREDNISolone (MEDROL DOSEPAK) 4 MG tablet therapy pack Follow Package  Directions 21 tablet 0    norethindrone-ethinyl estradiol (NECON) 0.5-35 MG-MCG tablet Take 1 tablet by mouth daily.       No current facility-administered medications for this visit.           Objective    /73 (BP Location: Right arm, Patient Position: Sitting, Cuff Size: Adult Large)   Pulse 60   Temp 98  F (36.7  C) (Tympanic)   Resp 16   Wt 109.8 kg (242 lb)   LMP 01/13/2025 (Exact Date)   SpO2 100%   BMI 40.27 kg/m    Physical Exam   As noted above and including:   GEN: NAD  Nose; no obvious polpys, bilateral inflammation of lower turbinates, no obvious polyps    No results found for any visits on 03/15/25.                  The use of Dragon/YuMingle dictation services may have been used to construct the content in this note; any grammatical or spelling errors are non-intentional. Please contact the author of this note directly if you are in need of any clarification.

## 2025-03-15 NOTE — PATIENT INSTRUCTIONS
Start nasal fluticasone steroid spray use morning and night in both nostrils to help with inflammation of your nasal passages      nasal saline rinses are also an option if you have significant nasal drainage      Sudafed use for a few days to help with symptoms of pressure and congestion      Tylenol and/or ibuprofen as needed for discomfort every 4 hours     --    If a week goes by and your nasal congestion and drainage has not improved start the Augmentin antibiotic to treat for suspected bacterial sinus infection

## 2025-03-17 ENCOUNTER — PATIENT OUTREACH (OUTPATIENT)
Dept: CARE COORDINATION | Facility: CLINIC | Age: 30
End: 2025-03-17
Payer: COMMERCIAL

## 2025-04-29 ENCOUNTER — VIRTUAL VISIT (OUTPATIENT)
Dept: FAMILY MEDICINE | Facility: CLINIC | Age: 30
End: 2025-04-29
Payer: COMMERCIAL

## 2025-04-29 ENCOUNTER — NURSE TRIAGE (OUTPATIENT)
Dept: FAMILY MEDICINE | Facility: CLINIC | Age: 30
End: 2025-04-29

## 2025-04-29 DIAGNOSIS — R11.2 NAUSEA AND VOMITING, UNSPECIFIED VOMITING TYPE: Primary | ICD-10-CM

## 2025-04-29 PROCEDURE — 98005 SYNCH AUDIO-VIDEO EST LOW 20: CPT | Performed by: PHYSICIAN ASSISTANT

## 2025-04-29 RX ORDER — ONDANSETRON 4 MG/1
4 TABLET, ORALLY DISINTEGRATING ORAL EVERY 8 HOURS PRN
Qty: 30 TABLET | Refills: 1 | Status: SHIPPED | OUTPATIENT
Start: 2025-04-29

## 2025-04-29 ASSESSMENT — ENCOUNTER SYMPTOMS: NAUSEA: 1

## 2025-04-29 NOTE — TELEPHONE ENCOUNTER
"Care advice:  A video visit      Disposition:  A video visit scheduled for this afternoon with pcp.    Reason for Disposition   Patient wants to be seen    Additional Information   Negative: Nausea or vomiting and pregnancy < 20 weeks   Negative: Menstrual Period - Missed or Late (i.e., pregnancy suspected)   Negative: Heat exhaustion suspected (i.e., dehydration from heat exposure)   Negative: Anxiety or stress suspected (i.e., nausea with anxiety attacks or stressful situations)   Negative: Traumatic Brain Injury (TBI) suspected   Negative: Vomiting occurs   Negative: Other symptom is present, see that guideline.  (e.g., chest pain, headache, dizziness, abdominal pain, colds, sore throat, etc.).   Negative: Shock suspected (e.g., cold/pale/clammy skin, too weak to stand, low BP, rapid pulse)   Negative: Sounds like a life-threatening emergency to the triager   Negative: Unable to walk, or can only walk with assistance (e.g., requires support)   Negative: Difficulty breathing   Negative: Insulin-dependent diabetes (Type I) and glucose > 400 mg/dL (22 mmol/L)   Negative: Drinking very little and dehydration suspected (e.g., no urine > 12 hours, very dry mouth, very lightheaded)   Negative: Patient sounds very sick or weak to the triager   Negative: Fever > 104 F  (40 C)   Negative: Fever > 101 F  (38.3 C) and over 60 years of age   Negative: Fever > 100.0 F  (37.8 C) and bedridden (e.g., CVA, chronic illness, recovering from surgery)   Negative: Fever > 100.0 F  (37.8 C) and diabetes mellitus or weak immune system (e.g., HIV positive, cancer chemo, splenectomy, chronic steroids)   Negative: Taking any of the following medications: digoxin (Lanoxin), lithium, theophylline, phenytoin (Dilantin)   Negative: Yellowish color of the skin or white of the eye (i.e., jaundice)   Negative: Fever present > 3 days (72 hours)    Answer Assessment - Initial Assessment Questions  1. NAUSEA SEVERITY: \"How bad is the nausea?\" (e.g., " "mild, moderate, severe; dehydration, weight loss)    - MILD: loss of appetite without change in eating habits    - MODERATE: decreased oral intake without significant weight loss, dehydration, or malnutrition    - SEVERE: inadequate caloric or fluid intake, significant weight loss, symptoms of dehydration      Son was diagnosed with GI bug.  Patient caught it (diarrhea) and was feeling nauseous.  Is requesting Zofran as son was prescribed Zofran to help with the nausea.      2. ONSET: \"When did the nausea begin?\"      This morning at 3 am.    3. VOMITING: \"Any vomiting?\" If Yes, ask: \"How many times today?\"      No vomiting but closed.  Not able to eat or drink anything. Last food or water was last night.      4. RECURRENT SYMPTOM: \"Have you had nausea before?\" If Yes, ask: \"When was the last time?\" \"What happened that time?\"      Have had this before.    5. CAUSE: \"What do you think is causing the nausea?\"      Norovirus (?)    6. PREGNANCY: \"Is there any chance you are pregnant?\" (e.g., unprotected intercourse, missed birth control pill, broken condom)      N/a    Protocols used: Nausea-A-OH    "

## 2025-04-29 NOTE — PROGRESS NOTES
"Viktoria is a 29 year old who is being evaluated via a billable video visit.    How would you like to obtain your AVS? MyChart  If the video visit is dropped, the invitation should be resent by: Text to cell phone: [phone]  Will anyone else be joining your video visit? No      Assessment & Plan     (R11.2) Nausea and vomiting, unspecified vomiting type  (primary encounter diagnosis)  Comment:   Plan: ondansetron (ZOFRAN ODT) 4 MG ODT tab          Expected time of recovery from norovirus discussed today.  Watch for signs of dehydration and call if not improving.  Patient has appointment scheduled for Monday, can move this up if feeling worse prior to that appointment.          BMI  Estimated body mass index is 40.27 kg/m  as calculated from the following:    Height as of 1/24/25: 1.651 m (5' 5\").    Weight as of 3/15/25: 109.8 kg (242 lb).             Subjective   Viktoria is a 29 year old, presenting for the following health issues:  Norovirus and Nausea    Nausea  Associated symptoms include nausea.   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  Patient has been treating with ice chips and Pedialyte, no signs of dehydration reported.  No fevers or chills.    Patient would like Zofran to help with nausea symptoms.              Objective    Vitals - Patient Reported  Weight (Patient Reported): 100.9 kg (222 lb 6.4 oz)  Height (Patient Reported): 165.1 cm (5' 5\")  BMI (Based on Pt Reported Ht/Wt): 37.01        Physical Exam   GENERAL: alert and no distress  EYES: Eyes grossly normal to inspection.  No discharge or erythema, or obvious scleral/conjunctival abnormalities.  RESP: No audible wheeze, cough, or visible cyanosis.    SKIN: Visible skin " clear. No significant rash, abnormal pigmentation or lesions.  NEURO: Cranial nerves grossly intact.  Mentation and speech appropriate for age.  PSYCH: Appropriate affect, tone, and pace of words          Video-Visit Details    Type of service:  Video Visit   Originating Location (pt. Location): Home    Distant Location (provider location):  On-site  Platform used for Video Visit: Rachel  Signed Electronically by: Fede Fagan PA-C

## 2025-05-05 ENCOUNTER — OFFICE VISIT (OUTPATIENT)
Dept: FAMILY MEDICINE | Facility: CLINIC | Age: 30
End: 2025-05-05
Payer: COMMERCIAL

## 2025-05-05 ENCOUNTER — ANCILLARY PROCEDURE (OUTPATIENT)
Dept: GENERAL RADIOLOGY | Facility: CLINIC | Age: 30
End: 2025-05-05
Attending: PHYSICIAN ASSISTANT
Payer: COMMERCIAL

## 2025-05-05 ENCOUNTER — LAB REQUISITION (OUTPATIENT)
Dept: LAB | Facility: CLINIC | Age: 30
End: 2025-05-05

## 2025-05-05 VITALS
WEIGHT: 220 LBS | TEMPERATURE: 98.2 F | OXYGEN SATURATION: 98 % | HEIGHT: 65 IN | RESPIRATION RATE: 20 BRPM | HEART RATE: 72 BPM | DIASTOLIC BLOOD PRESSURE: 80 MMHG | BODY MASS INDEX: 36.65 KG/M2 | SYSTOLIC BLOOD PRESSURE: 120 MMHG

## 2025-05-05 DIAGNOSIS — M79.672 LEFT FOOT PAIN: Primary | ICD-10-CM

## 2025-05-05 DIAGNOSIS — M79.672 LEFT FOOT PAIN: ICD-10-CM

## 2025-05-05 DIAGNOSIS — Z12.4 ENCOUNTER FOR SCREENING FOR MALIGNANT NEOPLASM OF CERVIX: ICD-10-CM

## 2025-05-05 PROCEDURE — 73630 X-RAY EXAM OF FOOT: CPT | Mod: TC | Performed by: STUDENT IN AN ORGANIZED HEALTH CARE EDUCATION/TRAINING PROGRAM

## 2025-05-05 PROCEDURE — 99213 OFFICE O/P EST LOW 20 MIN: CPT | Performed by: PHYSICIAN ASSISTANT

## 2025-05-05 PROCEDURE — 3079F DIAST BP 80-89 MM HG: CPT | Performed by: PHYSICIAN ASSISTANT

## 2025-05-05 PROCEDURE — G0145 SCR C/V CYTO,THINLAYER,RESCR: HCPCS | Performed by: OBSTETRICS & GYNECOLOGY

## 2025-05-05 PROCEDURE — 3074F SYST BP LT 130 MM HG: CPT | Performed by: PHYSICIAN ASSISTANT

## 2025-05-05 PROCEDURE — 1125F AMNT PAIN NOTED PAIN PRSNT: CPT | Performed by: PHYSICIAN ASSISTANT

## 2025-05-05 ASSESSMENT — PAIN SCALES - GENERAL: PAINLEVEL_OUTOF10: MILD PAIN (3)

## 2025-05-05 NOTE — PROGRESS NOTES
"  Assessment & Plan     (M79.672) Left foot pain  (primary encounter diagnosis)  Comment:   Plan: XR Foot Left G/E 3 Views          Pending imaging, suspect based on timeframe and exam today and chronic tendonitis vs hairline fracture.  CAM boot, RICE and NSAIDs advised, if not improving in 3-4 weeks, order MRI and or see podiatry    BMI  Estimated body mass index is 36.61 kg/m  as calculated from the following:    Height as of this encounter: 1.651 m (5' 5\").    Weight as of this encounter: 99.8 kg (220 lb).             Jamie Blum is a 29 year old, presenting for the following health issues:  Musculoskeletal Problem (Left foot)        5/5/2025    11:24 AM   Additional Questions   Roomed by Song SYED MA   Accompanied by Self         5/5/2025    11:24 AM   Patient Reported Additional Medications   Patient reports taking the following new medications None     History of Present Illness       Reason for visit:  Norovirus  Symptom onset:  Today  Symptoms include:  Loss of appetite, nausea  Symptom intensity:  Moderate  Symptom progression:  Improving       Pain in her lateral left foot for the past 3 months,  noting pain while kneeling at [employer], heard a pop.  Pain the the 4th and 5th MT since then.  Still able to walk, cetain contact can make pain worse, no swelling or redness.      Wt Readings from Last 5 Encounters:   05/05/25 99.8 kg (220 lb)   03/15/25 109.8 kg (242 lb)   02/08/25 112.5 kg (248 lb)   01/24/25 107 kg (236 lb)   01/22/25 110.7 kg (244 lb)             Objective    /80 (BP Location: Right arm, Patient Position: Sitting, Cuff Size: Adult Regular)   Pulse 72   Temp 98.2  F (36.8  C) (Temporal)   Resp 20   Ht 1.651 m (5' 5\")   Wt 99.8 kg (220 lb)   LMP 04/08/2025 (Exact Date)   SpO2 98%   BMI 36.61 kg/m    Body mass index is 36.61 kg/m .  Physical Exam  Musculoskeletal:      Left foot: Normal range of motion. No deformity.        Feet:    Feet:      Left foot:      Skin integrity: Skin " integrity normal.      Toenail Condition: Left toenails are normal.      Comments: Area of TTP highlighted on drawing       Ortho Exam              Signed Electronically by: Fede Fagan PA-C

## 2025-05-17 ENCOUNTER — HEALTH MAINTENANCE LETTER (OUTPATIENT)
Age: 30
End: 2025-05-17

## 2025-06-02 ENCOUNTER — MYC MEDICAL ADVICE (OUTPATIENT)
Dept: FAMILY MEDICINE | Facility: CLINIC | Age: 30
End: 2025-06-02
Payer: COMMERCIAL

## 2025-06-02 DIAGNOSIS — M79.672 LEFT FOOT PAIN: Primary | ICD-10-CM

## 2025-06-04 NOTE — TELEPHONE ENCOUNTER
Fede: pended podiatry referral per your note    Not sure if I need to have a follow up. My foot is doing better still can be sore if I overdue it. So not sure if I need to see you for a follow up or if I should keep wearing the boot longer? Please let me know.     Liliana YANCEY BSN, PHN, RN-[facility]  [phone]

## 2025-06-05 NOTE — TELEPHONE ENCOUNTER
Writer replied to patient via [employer].  SADIE Miranda BSN, PHN, AMB-BC (she/her)  [facility] RN

## 2025-07-14 ENCOUNTER — ANCILLARY PROCEDURE (OUTPATIENT)
Dept: GENERAL RADIOLOGY | Facility: CLINIC | Age: 30
End: 2025-07-14
Attending: FAMILY MEDICINE
Payer: COMMERCIAL

## 2025-07-14 ENCOUNTER — NURSE TRIAGE (OUTPATIENT)
Dept: NURSING | Facility: CLINIC | Age: 30
End: 2025-07-14
Payer: COMMERCIAL

## 2025-07-14 ENCOUNTER — OFFICE VISIT (OUTPATIENT)
Dept: URGENT CARE | Facility: URGENT CARE | Age: 30
End: 2025-07-14
Payer: COMMERCIAL

## 2025-07-14 VITALS
DIASTOLIC BLOOD PRESSURE: 83 MMHG | HEART RATE: 87 BPM | RESPIRATION RATE: 20 BRPM | BODY MASS INDEX: 38.51 KG/M2 | TEMPERATURE: 99 F | SYSTOLIC BLOOD PRESSURE: 118 MMHG | HEIGHT: 65 IN | WEIGHT: 231.1 LBS | OXYGEN SATURATION: 100 %

## 2025-07-14 DIAGNOSIS — R19.7 DIARRHEA, UNSPECIFIED TYPE: Primary | ICD-10-CM

## 2025-07-14 DIAGNOSIS — K31.89 GASTRIC DISTENTION: ICD-10-CM

## 2025-07-14 DIAGNOSIS — E87.5 HYPERKALEMIA: ICD-10-CM

## 2025-07-14 DIAGNOSIS — R19.7 DIARRHEA, UNSPECIFIED TYPE: ICD-10-CM

## 2025-07-14 LAB
ALBUMIN SERPL-MCNC: 4.3 G/DL (ref 3.4–5)
ALP SERPL-CCNC: 57 U/L (ref 40–150)
ALT SERPL W P-5'-P-CCNC: 19 U/L (ref 0–50)
ANION GAP SERPL CALCULATED.3IONS-SCNC: 3 MMOL/L (ref 3–14)
AST SERPL W P-5'-P-CCNC: 33 U/L (ref 0–45)
BASOPHILS # BLD AUTO: 0 10E3/UL (ref 0–0.2)
BASOPHILS NFR BLD AUTO: 0 %
BILIRUB SERPL-MCNC: 0.4 MG/DL (ref 0.2–1.3)
BUN SERPL-MCNC: 11 MG/DL (ref 7–30)
CALCIUM SERPL-MCNC: 10.4 MG/DL (ref 8.5–10.1)
CHLORIDE BLD-SCNC: 112 MMOL/L (ref 94–109)
CO2 SERPL-SCNC: 25 MMOL/L (ref 20–32)
CREAT SERPL-MCNC: 0.8 MG/DL (ref 0.52–1.04)
EGFRCR SERPLBLD CKD-EPI 2021: >90 ML/MIN/1.73M2
EOSINOPHIL # BLD AUTO: 0.5 10E3/UL (ref 0–0.7)
EOSINOPHIL NFR BLD AUTO: 4 %
ERYTHROCYTE [DISTWIDTH] IN BLOOD BY AUTOMATED COUNT: 12.9 % (ref 10–15)
GLUCOSE BLD-MCNC: 93 MG/DL (ref 70–99)
HCT VFR BLD AUTO: 44.1 % (ref 35–47)
HGB BLD-MCNC: 14.4 G/DL (ref 11.7–15.7)
IMM GRANULOCYTES # BLD: 0 10E3/UL
IMM GRANULOCYTES NFR BLD: 0 %
LYMPHOCYTES # BLD AUTO: 2.6 10E3/UL (ref 0.8–5.3)
LYMPHOCYTES NFR BLD AUTO: 19 %
MCH RBC QN AUTO: 29.1 PG (ref 26.5–33)
MCHC RBC AUTO-ENTMCNC: 32.7 G/DL (ref 31.5–36.5)
MCV RBC AUTO: 89 FL (ref 78–100)
MONOCYTES # BLD AUTO: 0.7 10E3/UL (ref 0–1.3)
MONOCYTES NFR BLD AUTO: 5 %
NEUTROPHILS # BLD AUTO: 9.8 10E3/UL (ref 1.6–8.3)
NEUTROPHILS NFR BLD AUTO: 72 %
PLATELET # BLD AUTO: 249 10E3/UL (ref 150–450)
POTASSIUM BLD-SCNC: 5.5 MMOL/L (ref 3.4–5.3)
PROT SERPL-MCNC: 8.4 G/DL (ref 6.8–8.8)
RBC # BLD AUTO: 4.94 10E6/UL (ref 3.8–5.2)
SODIUM SERPL-SCNC: 140 MMOL/L (ref 135–145)
WBC # BLD AUTO: 13.7 10E3/UL (ref 4–11)

## 2025-07-14 PROCEDURE — 36415 COLL VENOUS BLD VENIPUNCTURE: CPT | Performed by: FAMILY MEDICINE

## 2025-07-14 PROCEDURE — 3079F DIAST BP 80-89 MM HG: CPT | Performed by: FAMILY MEDICINE

## 2025-07-14 PROCEDURE — 80053 COMPREHEN METABOLIC PANEL: CPT | Performed by: FAMILY MEDICINE

## 2025-07-14 PROCEDURE — 87209 SMEAR COMPLEX STAIN: CPT | Performed by: FAMILY MEDICINE

## 2025-07-14 PROCEDURE — 99417 PROLNG OP E/M EACH 15 MIN: CPT | Performed by: FAMILY MEDICINE

## 2025-07-14 PROCEDURE — 3074F SYST BP LT 130 MM HG: CPT | Performed by: FAMILY MEDICINE

## 2025-07-14 PROCEDURE — 87506 IADNA-DNA/RNA PROBE TQ 6-11: CPT | Performed by: FAMILY MEDICINE

## 2025-07-14 PROCEDURE — 87177 OVA AND PARASITES SMEARS: CPT | Performed by: FAMILY MEDICINE

## 2025-07-14 PROCEDURE — 99215 OFFICE O/P EST HI 40 MIN: CPT | Performed by: FAMILY MEDICINE

## 2025-07-14 PROCEDURE — 85025 COMPLETE CBC W/AUTO DIFF WBC: CPT | Performed by: FAMILY MEDICINE

## 2025-07-14 PROCEDURE — 74019 RADEX ABDOMEN 2 VIEWS: CPT | Mod: TC | Performed by: RADIOLOGY

## 2025-07-14 RX ORDER — DICYCLOMINE HYDROCHLORIDE 10 MG/1
10 CAPSULE ORAL 4 TIMES DAILY PRN
Qty: 20 CAPSULE | Refills: 0 | Status: SHIPPED | OUTPATIENT
Start: 2025-07-14

## 2025-07-14 RX ORDER — ONDANSETRON 4 MG/1
4 TABLET, ORALLY DISINTEGRATING ORAL EVERY 8 HOURS PRN
Qty: 20 TABLET | Refills: 0 | Status: SHIPPED | OUTPATIENT
Start: 2025-07-14

## 2025-07-14 NOTE — TELEPHONE ENCOUNTER
Last Monday had diarrhea. Lasted till Wednesday. It's diarrhea all day today. Stomach pains and nausea this time. She will try and get into urgent care tonight. She declined getting an appointment for tomorrow.      Samira Lucero RN  [facility]    Reason for Disposition   [1] SEVERE diarrhea (e.g., 7 or more times / day more than normal) AND [2] present > 24 hours (1 day)    Additional Information   Negative: Shock suspected (e.g., cold/pale/clammy skin, too weak to stand, low BP, rapid pulse)   Negative: Difficult to awaken or acting confused (e.g., disoriented, slurred speech)   Negative: Sounds like a life-threatening emergency to the triager   Negative: Vomiting also present and worse than the diarrhea   Negative: [1] Blood in stool AND [2] without diarrhea   Negative: Diarrhea in a cancer patient who is currently (or recently) receiving chemotherapy or radiation therapy, or cancer patient who has metastatic or end-stage cancer and is receiving palliative care   Negative: Diarrhea begins while taking an antibiotic by mouth (oral antibiotic)   Negative: [1] SEVERE abdominal pain (e.g., excruciating) AND [2] present > 1 hour     Pain at 4 constant   Negative: [1] SEVERE abdominal pain AND [2] age > 60 years   Negative: [1] Blood in the stool AND [2] moderate or large amount of blood   Negative: Black or tarry bowel movements  (Exception: Chronic-unchanged black-grey BMs AND is taking iron pills or Pepto-Bismol.)   Negative: [1] Drinking very little AND [2] dehydration suspected (e.g., no urine > 12 hours, very dry mouth, very lightheaded)   Negative: Patient sounds very sick or weak to the triager   Negative: [1] SEVERE diarrhea (e.g., 7 or more times / day more than normal) AND [2] age > 60 years   Negative: [1] Constant abdominal pain AND [2] present > 2 hours   Negative: [1] Fever > 103 F (39.4 C) AND [2] not able to get the fever down using Fever Care Advice    Protocols used: Diarrhea-A-

## 2025-07-14 NOTE — PROGRESS NOTES
Urgent Care Clinic Visit    Chief Complaint   Patient presents with    Abdominal Pain     Start a week sx abdominal pain, bilateral lower abdomen/pelvic pain, bilateral lower back pain, diarrhea- over 10x/24hrs, excessive gas and bloating, [place]                7/14/2025     7:02 PM   Additional Questions   Roomed by mf   Accompanied by self         7/14/2025   Forms   Any forms needing to be completed Yes

## 2025-07-15 ENCOUNTER — OFFICE VISIT (OUTPATIENT)
Dept: PEDIATRICS | Facility: CLINIC | Age: 30
End: 2025-07-15
Attending: FAMILY MEDICINE
Payer: COMMERCIAL

## 2025-07-15 ENCOUNTER — TELEPHONE (OUTPATIENT)
Dept: PEDIATRICS | Facility: CLINIC | Age: 30
End: 2025-07-15
Payer: COMMERCIAL

## 2025-07-15 ENCOUNTER — HOSPITAL ENCOUNTER (OUTPATIENT)
Dept: CT IMAGING | Facility: CLINIC | Age: 30
Discharge: HOME | End: 2025-07-15
Attending: PHYSICIAN ASSISTANT
Payer: COMMERCIAL

## 2025-07-15 VITALS
TEMPERATURE: 98.1 F | WEIGHT: 230 LBS | HEART RATE: 80 BPM | BODY MASS INDEX: 38.27 KG/M2 | RESPIRATION RATE: 18 BRPM | DIASTOLIC BLOOD PRESSURE: 85 MMHG | SYSTOLIC BLOOD PRESSURE: 121 MMHG | OXYGEN SATURATION: 97 %

## 2025-07-15 DIAGNOSIS — B37.31 VULVOVAGINAL CANDIDIASIS: ICD-10-CM

## 2025-07-15 DIAGNOSIS — N30.01 ACUTE CYSTITIS WITH HEMATURIA: ICD-10-CM

## 2025-07-15 DIAGNOSIS — K52.9 ACUTE GASTROENTERITIS: Primary | ICD-10-CM

## 2025-07-15 DIAGNOSIS — R10.84 ABDOMINAL PAIN, GENERALIZED: ICD-10-CM

## 2025-07-15 DIAGNOSIS — R19.7 DIARRHEA, UNSPECIFIED TYPE: ICD-10-CM

## 2025-07-15 LAB
ALBUMIN SERPL BCG-MCNC: 4.4 G/DL (ref 3.5–5.2)
ALBUMIN UR-MCNC: 10 MG/DL
ALP SERPL-CCNC: 67 U/L (ref 40–150)
ALT SERPL W P-5'-P-CCNC: 16 U/L (ref 0–50)
AMYLASE SERPL-CCNC: 40 U/L (ref 28–100)
ANION GAP SERPL CALCULATED.3IONS-SCNC: 14 MMOL/L (ref 7–15)
APPEARANCE UR: ABNORMAL
AST SERPL W P-5'-P-CCNC: 28 U/L (ref 0–45)
BACTERIA #/AREA URNS HPF: ABNORMAL /HPF
BASOPHILS # BLD AUTO: 0 10E3/UL (ref 0–0.2)
BASOPHILS NFR BLD AUTO: 0 %
BILIRUB SERPL-MCNC: 0.5 MG/DL
BILIRUB UR QL STRIP: NEGATIVE
BUN SERPL-MCNC: 14.8 MG/DL (ref 6–20)
C CAYETANENSIS DNA STL QL NAA+NON-PROBE: NEGATIVE
CALCIUM SERPL-MCNC: 8.9 MG/DL (ref 8.8–10.4)
CAMPYLOBACTER DNA SPEC NAA+PROBE: NEGATIVE
CHLORIDE SERPL-SCNC: 103 MMOL/L (ref 98–107)
COLOR UR AUTO: YELLOW
CREAT SERPL-MCNC: 0.75 MG/DL (ref 0.51–0.95)
CRYPTOSP DNA STL QL NAA+NON-PROBE: NEGATIVE
EC STX1+STX2 GENES STL QL NAA+NON-PROBE: NEGATIVE
EGFRCR SERPLBLD CKD-EPI 2021: >90 ML/MIN/1.73M2
EOSINOPHIL # BLD AUTO: 0.4 10E3/UL (ref 0–0.7)
EOSINOPHIL NFR BLD AUTO: 4 %
ERYTHROCYTE [DISTWIDTH] IN BLOOD BY AUTOMATED COUNT: 13 % (ref 10–15)
G LAMBLIA DNA STL QL NAA+NON-PROBE: NEGATIVE
GLUCOSE SERPL-MCNC: 82 MG/DL (ref 70–99)
GLUCOSE UR STRIP-MCNC: NEGATIVE MG/DL
HCG UR QL: NEGATIVE
HCO3 SERPL-SCNC: 20 MMOL/L (ref 22–29)
HCT VFR BLD AUTO: 42.7 % (ref 35–47)
HGB BLD-MCNC: 14 G/DL (ref 11.7–15.7)
HGB UR QL STRIP: ABNORMAL
HYALINE CASTS: 1 /LPF
IMM GRANULOCYTES # BLD: 0 10E3/UL
IMM GRANULOCYTES NFR BLD: 0 %
KETONES UR STRIP-MCNC: NEGATIVE MG/DL
LEUKOCYTE ESTERASE UR QL STRIP: ABNORMAL
LIPASE SERPL-CCNC: 19 U/L (ref 13–60)
LYMPHOCYTES # BLD AUTO: 2.7 10E3/UL (ref 0.8–5.3)
LYMPHOCYTES NFR BLD AUTO: 28 %
MCH RBC QN AUTO: 29.3 PG (ref 26.5–33)
MCHC RBC AUTO-ENTMCNC: 32.8 G/DL (ref 31.5–36.5)
MCV RBC AUTO: 89 FL (ref 78–100)
MONOCYTES # BLD AUTO: 0.7 10E3/UL (ref 0–1.3)
MONOCYTES NFR BLD AUTO: 7 %
MUCOUS THREADS #/AREA URNS LPF: PRESENT /LPF
NEUTROPHILS # BLD AUTO: 6 10E3/UL (ref 1.6–8.3)
NEUTROPHILS NFR BLD AUTO: 61 %
NITRATE UR QL: NEGATIVE
NOROVIRUS GI+II RNA STL QL NAA+NON-PROBE: NEGATIVE
NRBC # BLD AUTO: 0 10E3/UL
NRBC BLD AUTO-RTO: 0 /100
PH UR STRIP: 6 [PH] (ref 5–7)
PLATELET # BLD AUTO: 344 10E3/UL (ref 150–450)
POTASSIUM SERPL-SCNC: 4.4 MMOL/L (ref 3.4–5.3)
PROT SERPL-MCNC: 7.2 G/DL (ref 6.4–8.3)
RBC # BLD AUTO: 4.78 10E6/UL (ref 3.8–5.2)
RBC URINE: 3 /HPF
SALMONELLA SP RPOD STL QL NAA+PROBE: NEGATIVE
SHIGELLA SP+EIEC IPAH ST NAA+NON-PROBE: NEGATIVE
SODIUM SERPL-SCNC: 137 MMOL/L (ref 135–145)
SP GR UR STRIP: 1.03 (ref 1–1.03)
SQUAMOUS EPITHELIAL: 15 /HPF
UROBILINOGEN UR STRIP-MCNC: NORMAL MG/DL
VIBRIO DNA SPEC NAA+PROBE: NEGATIVE
WBC # BLD AUTO: 9.9 10E3/UL (ref 4–11)
WBC URINE: 8 /HPF
Y ENTEROCOL DNA STL QL NAA+PROBE: NEGATIVE

## 2025-07-15 PROCEDURE — 3074F SYST BP LT 130 MM HG: CPT | Performed by: PHYSICIAN ASSISTANT

## 2025-07-15 PROCEDURE — 99214 OFFICE O/P EST MOD 30 MIN: CPT | Performed by: PHYSICIAN ASSISTANT

## 2025-07-15 PROCEDURE — 81025 URINE PREGNANCY TEST: CPT | Performed by: PHYSICIAN ASSISTANT

## 2025-07-15 PROCEDURE — 87086 URINE CULTURE/COLONY COUNT: CPT | Performed by: PHYSICIAN ASSISTANT

## 2025-07-15 PROCEDURE — 3079F DIAST BP 80-89 MM HG: CPT | Performed by: PHYSICIAN ASSISTANT

## 2025-07-15 PROCEDURE — 81001 URINALYSIS AUTO W/SCOPE: CPT | Performed by: PHYSICIAN ASSISTANT

## 2025-07-15 PROCEDURE — 250N000009 HC RX 250: Performed by: PHYSICIAN ASSISTANT

## 2025-07-15 PROCEDURE — 36415 COLL VENOUS BLD VENIPUNCTURE: CPT | Performed by: PHYSICIAN ASSISTANT

## 2025-07-15 PROCEDURE — 82150 ASSAY OF AMYLASE: CPT | Performed by: PHYSICIAN ASSISTANT

## 2025-07-15 PROCEDURE — 80053 COMPREHEN METABOLIC PANEL: CPT | Performed by: PHYSICIAN ASSISTANT

## 2025-07-15 PROCEDURE — 74177 CT ABD & PELVIS W/CONTRAST: CPT

## 2025-07-15 PROCEDURE — 83690 ASSAY OF LIPASE: CPT | Performed by: PHYSICIAN ASSISTANT

## 2025-07-15 PROCEDURE — 85025 COMPLETE CBC W/AUTO DIFF WBC: CPT | Performed by: PHYSICIAN ASSISTANT

## 2025-07-15 PROCEDURE — 250N000011 HC RX IP 250 OP 636: Performed by: PHYSICIAN ASSISTANT

## 2025-07-15 RX ORDER — SULFAMETHOXAZOLE AND TRIMETHOPRIM 800; 160 MG/1; MG/1
1 TABLET ORAL 2 TIMES DAILY
Qty: 6 TABLET | Refills: 0 | Status: SHIPPED | OUTPATIENT
Start: 2025-07-15 | End: 2025-07-18

## 2025-07-15 RX ORDER — FLUCONAZOLE 150 MG/1
150 TABLET ORAL ONCE
Qty: 1 TABLET | Refills: 0 | Status: SHIPPED | OUTPATIENT
Start: 2025-07-15 | End: 2025-07-15

## 2025-07-15 RX ORDER — IOPAMIDOL 755 MG/ML
500 INJECTION, SOLUTION INTRAVASCULAR ONCE
Status: COMPLETED | OUTPATIENT
Start: 2025-07-15 | End: 2025-07-15

## 2025-07-15 RX ADMIN — IOPAMIDOL 100 ML: 755 INJECTION, SOLUTION INTRAVENOUS at 13:42

## 2025-07-15 RX ADMIN — SODIUM CHLORIDE 100 ML: 9 INJECTION, SOLUTION INTRAVENOUS at 13:42

## 2025-07-15 SDOH — HEALTH STABILITY: PHYSICAL HEALTH: ON AVERAGE, HOW MANY DAYS PER WEEK DO YOU ENGAGE IN MODERATE TO STRENUOUS EXERCISE (LIKE A BRISK WALK)?: 6 DAYS

## 2025-07-15 SDOH — HEALTH STABILITY: PHYSICAL HEALTH: ON AVERAGE, HOW MANY MINUTES DO YOU ENGAGE IN EXERCISE AT THIS LEVEL?: 60 MIN

## 2025-07-15 ASSESSMENT — SOCIAL DETERMINANTS OF HEALTH (SDOH)
HOW OFTEN DO YOU ATTEND CHURCH OR RELIGIOUS SERVICES?: MORE THAN 4 TIMES PER YEAR
HOW OFTEN DO YOU GET TOGETHER WITH FRIENDS OR RELATIVES?: MORE THAN THREE TIMES A WEEK
DO YOU BELONG TO ANY CLUBS OR ORGANIZATIONS SUCH AS CHURCH GROUPS UNIONS, FRATERNAL OR ATHLETIC GROUPS, OR SCHOOL GROUPS?: YES
HOW OFTEN DO YOU ATTENT MEETINGS OF THE CLUB OR ORGANIZATION YOU BELONG TO?: MORE THAN 4 TIMES PER YEAR
IN A TYPICAL WEEK, HOW MANY TIMES DO YOU TALK ON THE PHONE WITH FAMILY, FRIENDS, OR NEIGHBORS?: MORE THAN THREE TIMES A WEEK

## 2025-07-15 ASSESSMENT — LIFESTYLE VARIABLES
HOW OFTEN DO YOU HAVE A DRINK CONTAINING ALCOHOL: MONTHLY OR LESS
AUDIT-C TOTAL SCORE: 4
HOW MANY STANDARD DRINKS CONTAINING ALCOHOL DO YOU HAVE ON A TYPICAL DAY: 5 OR 6
HOW OFTEN DO YOU HAVE SIX OR MORE DRINKS ON ONE OCCASION: LESS THAN MONTHLY
SKIP TO QUESTIONS 9-10: 0

## 2025-07-15 NOTE — TELEPHONE ENCOUNTER
Pt returned call, scheduled today at 12:00 with Laure Morales , [facility] [place]  07/15/25 9:36 AM

## 2025-07-15 NOTE — TELEPHONE ENCOUNTER
LVM for pt to call [facility] to schedule to be seen today. Please transfer to [phone] for scheduling     Tod Morales , [facility] [place]  [facility] - [place]  07/15/25 9:24 AM

## 2025-07-15 NOTE — PATIENT INSTRUCTIONS
You were seen and evaluated today for abdominal pain and bloating.  Urinalysis gives me concern for urinary tract infection, starting Bactrim today.  CT scan of the abdomen and pelvis is consistent with gastroenteritis, supportive care is recommended.  Would recommend prescribed medications to be used as needed, including Bentyl and ondansetron.  May also use Mylanta for symptomatic relief of abdominal discomfort and pain/bloating.  I would expect that your symptoms will resolve and 3 to 5 days.  If symptoms persist, follow-up with PCP.  Recommend urgent medical evaluation in the emergency room if you experience acutely worsening abdominal pain or fevers, persistent nausea/vomiting, intolerance of food and fluid for over 24 hours, blood in the urine, pelvic pain or pressure.        
None

## 2025-07-15 NOTE — PROGRESS NOTES
Acute and Diagnostic Services Clinic Visit    Assessment & Plan     (K52.9) Acute gastroenteritis  (primary encounter diagnosis)  Comment:   Plan:     (N30.01) Acute cystitis with hematuria  Comment:   Plan: sulfamethoxazole-trimethoprim (BACTRIM DS)         800-160 MG tablet            (R10.84) Abdominal pain, generalized  Comment:   Plan: UA with Microscopic reflex to Culture, HCG         qualitative urine, CBC with platelets         differential, Comprehensive metabolic panel,         Lipase, Amylase, CT Abdomen Pelvis w Contrast,         sodium chloride (PF) 0.9% PF flush 3 mL, Urine         Culture            (R19.7) Diarrhea, unspecified type  Comment:   Plan: sodium chloride (PF) 0.9% PF flush 3 mL         The patient is a 29-year-old female with a past medical history of hypothyroidism, migraine, PCOS who presents for evaluation of generalized abdominal pain and discomfort over the past week.     She presents to clinic appearing uncomfortable but afebrile.  Vital signs are reassuring and within normal limits.  She does elicit abdominal discomfort but findings do not suggest acute peritonitis today.  Has mild abdominal bloating as well.    Differential diagnosis includes but is not limited to ruptured ovarian cyst, mesenteric ischemia, appendicitis, pancreatitis, hepatobiliary disease, intestinal abscess, Meckel's diverticulum, small bowel obstruction, ectopic pregnancy.    Urine hCG is negative today.  CBC is unremarkable and within normal limits, no elevation in WBC count, no left shift.  CMP unremarkable.  Lipase and amylase within normal limits, reassuring for the absence of acute pancreatic process.    Urinalysis does show findings suggestive of acute cystitis including large leukocyte esterase, few bacteria, elevated RBCs and WBCs, urine culture results are pending.  Starting the patient on a 3-day course of Bactrim.  Providing her with a single dose of fluconazole as well due to concerns for developing  "vulvovaginal candidiasis after taking antibiotic.    CT abdomen pelvis shows fluid in the colon associated air-fluid levels, nonspecific finding that is suggestive of gastroenteritis or other diarrheal illness.  Enteric panel completed yesterday showed no positive results.  Parasitology stool test is still pending.    Would recommend prescribed medications to be used as needed, including Bentyl and ondansetron.  May also use Mylanta for symptomatic relief of abdominal discomfort and pain/bloating.  Suspect symptoms to resolve in 3 to 5 days as they are most consistent with gastroenteritis.  If symptoms persist, follow-up with PCP.  Recommend urgent medical evaluation in the emergency room if you experience acutely worsening abdominal pain or fevers, persistent nausea/vomiting, intolerance of food and fluid for over 24 hours, blood in the urine, pelvic pain or pressure.      Frantz Nguyễn PA-C      BMI  Estimated body mass index is 38.27 kg/m  as calculated from the following:    Height as of 7/14/25: 1.651 m (5' 5\").    Weight as of this encounter: 104.3 kg (230 lb).           Subjective   Viktoria is a 29 year old, presenting for the following health issues:  Diarrhea (X 1 week intermittently )    LASHONDA Blum is a 29-year-old female with a past medical history of hypothyroidism, migraine, PCOS who presents for evaluation of generalized abdominal pain and discomfort over the past week.  Pain has been intermittent.  She initially describes feeling bloating, pain and discomfort in the lower abdomen, also felt occasionally in the upper abdomen.  She initially developed abdominal pain shortly after [place] 4, 2025 [place] a [place]on due to ex menstrual period.  States that the tampon was placed the morning she developed abdominal pain.  [place] the tampon and replaced it with another.  Her abdominal pain resolved a few days after July 4 but then " returned over the past week.  Abdominal discomfort and pain was especially bad last night at which time she was evaluated in urgent care.  Workup in urgent care included an abdominal x-ray which showed no acute concerns.  Laboratory workup at the time of urgent care visit included CBC, which showed elevated WBC count at 13.7 with a left shift.  CMP showed elevated potassium at 5.5, otherwise grossly normal.  Stool testing in process, enteric pathogen panel and routine parasitology exam.  She was referred to [facility] clinic for further evaluation and management given persistent and worsening abdominal pain.  She has felt nausea on occasion but denies having nausea currently.  Had a low-grade fever last night of 99.1, now resolved.  Medicines prescribed in urgent care including Zofran, Bentyl and loperamide with somewhat limited relief.  Denies any chest pain or shortness of breath, no concerns of breathing or swallowing.  No swelling or pain or redness in the lower extremities.  No rashes.  No urinary complaints.    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  Recent use of antibiotics: No  Recent travels: No  Recent medication-new or changes(Rx or OTC): No  Precipitating or alleviating factors: unknown cause, [place] in, not sure if this was the cause or not.  Medications rx'd yesterday helped.   Therapies tried and outcome: Bentyl, Zofran, OTC Loperamide. These have helped sx's        Review of Systems  Constitutional, HEENT, cardiovascular,  pulmonary, GI, , musculoskeletal, neuro, skin, endocrine and psych systems are negative, except as otherwise noted.      Objective    Wt 104.3 kg (230 lb)   LMP 07/02/2025   BMI 38.27 kg/m    Body mass index is 38.27 kg/m .  Physical Exam  Constitutional:       General: She is not in acute distress.     Appearance: Normal appearance. She is not ill-appearing, toxic-appearing or diaphoretic.   HENT:      Head: Normocephalic and atraumatic.      Right Ear: No middle ear effusion. No mastoid tenderness. Tympanic membrane is not injected, perforated, erythematous, retracted or bulging.      Left Ear:  No middle ear effusion. No mastoid tenderness. Tympanic membrane is not injected, perforated, erythematous, retracted or bulging.      Mouth/Throat:      Mouth: Mucous membranes are moist.      Pharynx: Oropharynx is clear. No oropharyngeal exudate or posterior oropharyngeal erythema.   Cardiovascular:      Rate and Rhythm: Normal rate and regular rhythm.      Pulses: Normal pulses.      Heart sounds: Normal heart sounds. No murmur heard.  Pulmonary:      Effort: Pulmonary effort is normal. No respiratory distress.      Breath sounds: Normal breath sounds. No stridor. No wheezing or rhonchi.   Abdominal:      General: Abdomen is flat. Bowel sounds are increased. There is no distension.      Tenderness: There is generalized abdominal tenderness. There is no right CVA tenderness, left CVA tenderness or guarding.      Comments: Generalized abdominal tenderness.   Musculoskeletal:      Cervical back: Neck supple. No rigidity. No muscular tenderness.   Lymphadenopathy:      Cervical: No cervical adenopathy.      Right cervical: No superficial, deep or posterior cervical adenopathy.     Left cervical: No superficial, deep or posterior cervical adenopathy.   Neurological:      Mental Status: She is alert and oriented to person, place, and time.      Sensory: No sensory deficit.                    Signed Electronically by:  Frantz Nguyễn PA-C

## 2025-07-15 NOTE — PATIENT INSTRUCTIONS
Tylenol 500 mg every 4 hours as needed for discomfort      Dicyclomine medication every 6 hours as needed for cramping      Loperamide over-the-counter  medication every 6 hours as needed for watery loose stools      Zofran every 8 hours as needed for nausea      Gentle rehydration with 100 ounces of water/fluids a day      If symptoms have not improved in the next day reach out to your nurse triage team they can refer you to the acute diagnostic services for further evaluation      If symptoms worsen at any point such as becoming lightheaded, increasing pain, worsening loose stools --go to the emergency room right away      If pain increases or you become lightheaded or dizzy go to the emergency room right away

## 2025-07-15 NOTE — PROGRESS NOTES
Assessment & Plan     Diarrhea, unspecified type  - Ova and Parasite Exam Routine  - Focused Enteric Pathogen Panel by PCR  - dicyclomine (BENTYL) 10 MG capsule  Dispense: 20 capsule; Refill: 0  - ondansetron (ZOFRAN ODT) 4 MG ODT tab  Dispense: 20 tablet; Refill: 0  - CBC with platelets and differential  - Comprehensive metabolic panel (BMP + Alb, Alk Phos, ALT, AST, Total. Bili, TP)  - XR Abdomen 2 Views  - Focused Enteric Pathogen Panel by PCR  - CBC with platelets and differential  - Comprehensive metabolic panel (BMP + Alb, Alk Phos, ALT, AST, Total. Bili, TP)  - Ova and Parasite Exam Routine  - Referral to Acute and Diagnostic Services (Day of diagnostic / First order acute)    Hyperkalemia  Gastric distention     Stool testing advised given prominent return of loose stools after initial improvement.     They understand if symptoms worsen she is to go to the ED for evaluation immediately    Discussed and alleviated patient's concerns about prior conversation with nurse triage that discussed that she may be showing signs of I) tampon toxic shock syndrome and ii) possible diverticulitis requiring antibiotics.     Xray abd did not reveal any retained tampons , stomach distended uncertain on cause as patient has little to no oral intake today.     Blood work shows mild elevation of WBC in the setting of borderline fever, otherwise normotensive with normal mentation.   Potassium slightly elevated     Presentation/exam does not suggest appendicitis/cholecystitis/pancreatitis    Advised loperamide for loose stools  Tylenol /dicyclomine for discomfort  Gentle rehydration    Given peculiar findings of retained substances in the stomach, profuse diarrhea advised [facility] eval in the morning for further evaluation. Referral placed. Patient has # to call in morning around 930 if no call received    85 minutes spent on the date of the encounter doing chart review, history and exam, documentation and further activities per the  note.       Wes Arroyo MD   [facility]    Subjective     Viktoria is a 29 year old female who presents to clinic today for the following health issues:  Chief Complaint   Patient presents with    Abdominal Pain     Start a week sx abdominal pain, bilateral lower abdomen/pelvic pain, bilateral lower back pain, diarrhea- over 10x/24hrs, excessive gas and bloating, chills  hx travelled up [facility] none      HPI  Symptoms started a week ago after spending time at [place] had several days of 5-6 loose stools a day has not use any medications.  No recent use of antibiotics.  She may have been sick.  Reporting nausea and cramping today.    Patient has had 10 loose stools today which are watery nonbloody    No hx of GI disorders or prior surgeries    Called nurse triage who recommended she be seen right away as she may need antibiotics for diverticulitis and possible evaluation for toxic shock syndrome from retained tampon. Recently had period but does not believe she has a tampon placed, she and mother prefer a different screening method other than pelvic exam.       Patient Active Problem List    Diagnosis Date Noted    Alopecia 10/01/2024     Priority: Medium     Dx 05/2023      Class 3 obesity (H) 10/01/2024     Priority: Medium    Pregnancy 12/12/2021     Priority: Medium    Vaginal delivery 08/13/2020     Priority: Medium    Pregnant 08/12/2020     Priority: Medium    Hypothyroidism      Priority: Medium    Polycystic ovary syndrome      Priority: Medium    Migraine      Priority: Medium       Current Outpatient Medications   Medication Sig Dispense Refill    dicyclomine (BENTYL) 10 MG capsule Take 1 capsule (10 mg) by mouth 4 times daily as needed (cramping). 20 capsule 0    levothyroxine (SYNTHROID/LEVOTHROID) 75 MCG tablet Take 1 tablet (75 mcg) by mouth daily. 90 tablet 3    norethindrone-ethinyl estradiol (NECON) 0.5-35 MG-MCG tablet Take 1 tablet by mouth daily.      ondansetron (ZOFRAN  "ODT) 4 MG ODT tab Take 1 tablet (4 mg) by mouth every 8 hours as needed for nausea. 20 tablet 0    Probiotic Product (PROBIOTIC DAILY PO) Take by mouth.       No current facility-administered medications for this visit.           Objective    /83   Pulse 87   Temp 99  F (37.2  C)   Resp 20   Ht 1.651 m (5' 5\")   Wt 104.8 kg (231 lb 1.6 oz)   LMP 07/02/2025   SpO2 100%   BMI 38.46 kg/m    Physical Exam   As noted above and including:   GEN: NAD, MMM  CV: RRR no m/r/g  Abd: Mildly distended no guarding no focal discomfort    Results for orders placed or performed in visit on 07/14/25   XR Abdomen 2 Views     Status: None    Narrative    EXAM: XR ABDOMEN 2 VIEWS  LOCATION: [facility]  DATE: 7/14/2025    INDICATION: One week abdominal pain, diarrhea, also rule out retained tampon.  COMPARISON: None.      Impression    IMPRESSION: Nonobstructive bowel gas pattern. Small stool at the distal colon. Prominent ingested material distends the stomach. No free air. Clear lower lungs. No radiopaque foreign body is identified.   Results for orders placed or performed in visit on 07/14/25   Comprehensive metabolic panel (BMP + Alb, Alk Phos, ALT, AST, Total. Bili, TP)     Status: Abnormal   Result Value Ref Range    Sodium 140 135 - 145 mmol/L    Potassium 5.5 (H) 3.4 - 5.3 mmol/L    Chloride 112 (H) 94 - 109 mmol/L    Carbon Dioxide (CO2) 25 20 - 32 mmol/L    Anion Gap 3 3 - 14 mmol/L    Urea Nitrogen 11 7 - 30 mg/dL    Creatinine 0.80 0.52 - 1.04 mg/dL    GFR Estimate >90 >60 mL/min/1.73m2    Calcium 10.4 (H) 8.5 - 10.1 mg/dL    Glucose 93 70 - 99 mg/dL    Alkaline Phosphatase 57 40 - 150 U/L    AST 33 0 - 45 U/L    ALT 19 0 - 50 U/L    Protein Total 8.4 6.8 - 8.8 g/dL    Albumin 4.3 3.4 - 5.0 g/dL    Bilirubin Total 0.4 0.2 - 1.3 mg/dL   CBC with platelets and differential     Status: Abnormal   Result Value Ref Range    WBC Count 13.7 (H) 4.0 - 11.0 10e3/uL    RBC Count 4.94 3.80 - 5.20 " 10e6/uL    Hemoglobin 14.4 11.7 - 15.7 g/dL    Hematocrit 44.1 35.0 - 47.0 %    MCV 89 78 - 100 fL    MCH 29.1 26.5 - 33.0 pg    MCHC 32.7 31.5 - 36.5 g/dL    RDW 12.9 10.0 - 15.0 %    Platelet Count 249 150 - 450 10e3/uL    % Neutrophils 72 %    % Lymphocytes 19 %    % Monocytes 5 %    % Eosinophils 4 %    % Basophils 0 %    % Immature Granulocytes 0 %    Absolute Neutrophils 9.8 (H) 1.6 - 8.3 10e3/uL    Absolute Lymphocytes 2.6 0.8 - 5.3 10e3/uL    Absolute Monocytes 0.7 0.0 - 1.3 10e3/uL    Absolute Eosinophils 0.5 0.0 - 0.7 10e3/uL    Absolute Basophils 0.0 0.0 - 0.2 10e3/uL    Absolute Immature Granulocytes 0.0 <=0.4 10e3/uL   CBC with platelets and differential     Status: Abnormal    Narrative    The following orders were created for panel order CBC with platelets and differential.  Procedure                               Abnormality         Status                     ---------                               -----------         ------                     CBC with platelets and ...[[id number]]  Abnormal            Final result                 Please view results for these tests on the individual orders.                     The use of Dragon/[employer] services may have been used to construct the content in this note; any grammatical or spelling errors are non-intentional. Please contact the author of this note directly if you are in need of any clarification.

## 2025-07-16 LAB
BACTERIA UR CULT: NORMAL
O+P STL MICRO: NEGATIVE
SPECIMEN TYPE: NORMAL